# Patient Record
Sex: MALE | Race: WHITE | Employment: FULL TIME | URBAN - METROPOLITAN AREA
[De-identification: names, ages, dates, MRNs, and addresses within clinical notes are randomized per-mention and may not be internally consistent; named-entity substitution may affect disease eponyms.]

---

## 2017-01-03 ENCOUNTER — OFFICE VISIT (OUTPATIENT)
Dept: FAMILY MEDICINE CLINIC | Age: 62
End: 2017-01-03

## 2017-01-03 VITALS — SYSTOLIC BLOOD PRESSURE: 112 MMHG | TEMPERATURE: 97.1 F | DIASTOLIC BLOOD PRESSURE: 86 MMHG | HEART RATE: 80 BPM

## 2017-01-03 DIAGNOSIS — B86 SCABIES: ICD-10-CM

## 2017-01-03 DIAGNOSIS — L50.9 URTICARIA: ICD-10-CM

## 2017-01-03 DIAGNOSIS — J06.9 ACUTE UPPER RESPIRATORY INFECTION: Primary | ICD-10-CM

## 2017-01-03 PROCEDURE — 99213 OFFICE O/P EST LOW 20 MIN: CPT | Performed by: FAMILY MEDICINE

## 2017-01-03 RX ORDER — METHYLPREDNISOLONE 4 MG/1
TABLET ORAL
Qty: 1 KIT | Refills: 0 | Status: SHIPPED | OUTPATIENT
Start: 2017-01-03 | End: 2017-01-09

## 2017-01-03 RX ORDER — PERMETHRIN 50 MG/G
CREAM TOPICAL
Qty: 1 TUBE | Refills: 0 | Status: SHIPPED | OUTPATIENT
Start: 2017-01-03 | End: 2017-03-28 | Stop reason: ALTCHOICE

## 2017-01-03 RX ORDER — IBUPROFEN 800 MG/1
TABLET ORAL
Refills: 0 | COMMUNITY
Start: 2016-10-05 | End: 2017-03-28 | Stop reason: ALTCHOICE

## 2017-01-03 ASSESSMENT — ENCOUNTER SYMPTOMS
SORE THROAT: 1
SHORTNESS OF BREATH: 0
COUGH: 0
DIARRHEA: 0
CONSTIPATION: 0
ABDOMINAL PAIN: 0
EYES NEGATIVE: 1
NAUSEA: 0

## 2017-02-06 RX ORDER — PANTOPRAZOLE SODIUM 40 MG/1
TABLET, DELAYED RELEASE ORAL
Qty: 30 TABLET | Refills: 3 | Status: SHIPPED | OUTPATIENT
Start: 2017-02-06 | End: 2017-02-15 | Stop reason: SDUPTHER

## 2017-02-15 RX ORDER — PANTOPRAZOLE SODIUM 40 MG/1
40 TABLET, DELAYED RELEASE ORAL DAILY
Qty: 90 TABLET | Refills: 1 | Status: SHIPPED | OUTPATIENT
Start: 2017-02-15 | End: 2017-09-28 | Stop reason: SDUPTHER

## 2017-03-28 ENCOUNTER — OFFICE VISIT (OUTPATIENT)
Dept: FAMILY MEDICINE CLINIC | Age: 62
End: 2017-03-28

## 2017-03-28 VITALS
BODY MASS INDEX: 25.33 KG/M2 | RESPIRATION RATE: 14 BRPM | WEIGHT: 187 LBS | DIASTOLIC BLOOD PRESSURE: 80 MMHG | TEMPERATURE: 97.6 F | HEIGHT: 72 IN | HEART RATE: 86 BPM | SYSTOLIC BLOOD PRESSURE: 120 MMHG

## 2017-03-28 DIAGNOSIS — L73.9 FOLLICULITIS: ICD-10-CM

## 2017-03-28 DIAGNOSIS — J01.00 ACUTE NON-RECURRENT MAXILLARY SINUSITIS: Primary | ICD-10-CM

## 2017-03-28 DIAGNOSIS — B35.4 TINEA CORPORIS: ICD-10-CM

## 2017-03-28 DIAGNOSIS — R06.2 EXPIRATORY WHEEZING: ICD-10-CM

## 2017-03-28 PROCEDURE — 99213 OFFICE O/P EST LOW 20 MIN: CPT | Performed by: PHYSICIAN ASSISTANT

## 2017-03-28 RX ORDER — CEPHALEXIN 500 MG/1
500 CAPSULE ORAL 4 TIMES DAILY
Qty: 40 CAPSULE | Refills: 0 | Status: SHIPPED | OUTPATIENT
Start: 2017-03-28 | End: 2017-09-28 | Stop reason: ALTCHOICE

## 2017-03-28 RX ORDER — KETOCONAZOLE 20 MG/G
CREAM TOPICAL
Qty: 60 G | Refills: 1 | Status: SHIPPED | OUTPATIENT
Start: 2017-03-28 | End: 2017-09-28 | Stop reason: ALTCHOICE

## 2017-03-28 RX ORDER — METHYLPREDNISOLONE 4 MG/1
TABLET ORAL
Qty: 1 KIT | Refills: 0 | Status: SHIPPED | OUTPATIENT
Start: 2017-03-28 | End: 2017-04-03

## 2017-03-28 ASSESSMENT — ENCOUNTER SYMPTOMS
SORE THROAT: 0
SINUS PRESSURE: 1
CHEST TIGHTNESS: 1
WHEEZING: 0
RHINORRHEA: 1
SHORTNESS OF BREATH: 0
COLOR CHANGE: 0
COUGH: 1

## 2017-06-05 RX ORDER — PANTOPRAZOLE SODIUM 40 MG/1
TABLET, DELAYED RELEASE ORAL
Qty: 30 TABLET | Refills: 3 | Status: SHIPPED | OUTPATIENT
Start: 2017-06-05 | End: 2017-10-06 | Stop reason: SDUPTHER

## 2017-09-28 ENCOUNTER — OFFICE VISIT (OUTPATIENT)
Dept: FAMILY MEDICINE CLINIC | Age: 62
End: 2017-09-28

## 2017-09-28 VITALS
SYSTOLIC BLOOD PRESSURE: 120 MMHG | BODY MASS INDEX: 24.69 KG/M2 | TEMPERATURE: 96.3 F | DIASTOLIC BLOOD PRESSURE: 84 MMHG | HEART RATE: 71 BPM | OXYGEN SATURATION: 95 % | HEIGHT: 72 IN | WEIGHT: 182.31 LBS

## 2017-09-28 DIAGNOSIS — B96.89 ACUTE BACTERIAL SINUSITIS: Primary | ICD-10-CM

## 2017-09-28 DIAGNOSIS — J01.90 ACUTE BACTERIAL SINUSITIS: Primary | ICD-10-CM

## 2017-09-28 DIAGNOSIS — J42 CHRONIC BRONCHITIS, UNSPECIFIED CHRONIC BRONCHITIS TYPE (HCC): ICD-10-CM

## 2017-09-28 PROCEDURE — 99213 OFFICE O/P EST LOW 20 MIN: CPT | Performed by: FAMILY MEDICINE

## 2017-09-28 RX ORDER — METHYLPREDNISOLONE 4 MG/1
TABLET ORAL
Qty: 1 KIT | Refills: 0 | Status: SHIPPED | OUTPATIENT
Start: 2017-09-28 | End: 2019-01-29 | Stop reason: SDUPTHER

## 2017-09-28 RX ORDER — CEFDINIR 300 MG/1
600 CAPSULE ORAL DAILY
Qty: 20 CAPSULE | Refills: 0 | Status: SHIPPED | OUTPATIENT
Start: 2017-09-28 | End: 2017-12-21 | Stop reason: SDUPTHER

## 2017-10-06 RX ORDER — PANTOPRAZOLE SODIUM 40 MG/1
TABLET, DELAYED RELEASE ORAL
Qty: 30 TABLET | Refills: 3 | Status: SHIPPED | OUTPATIENT
Start: 2017-10-06 | End: 2018-02-12 | Stop reason: SDUPTHER

## 2017-12-21 ENCOUNTER — OFFICE VISIT (OUTPATIENT)
Dept: FAMILY MEDICINE CLINIC | Age: 62
End: 2017-12-21

## 2017-12-21 VITALS
RESPIRATION RATE: 12 BRPM | TEMPERATURE: 97.5 F | OXYGEN SATURATION: 96 % | DIASTOLIC BLOOD PRESSURE: 74 MMHG | BODY MASS INDEX: 24.68 KG/M2 | WEIGHT: 182.19 LBS | HEIGHT: 72 IN | HEART RATE: 69 BPM | SYSTOLIC BLOOD PRESSURE: 110 MMHG

## 2017-12-21 DIAGNOSIS — B96.89 ACUTE BACTERIAL SINUSITIS: Primary | ICD-10-CM

## 2017-12-21 DIAGNOSIS — J01.90 ACUTE BACTERIAL SINUSITIS: Primary | ICD-10-CM

## 2017-12-21 PROCEDURE — G8427 DOCREV CUR MEDS BY ELIG CLIN: HCPCS | Performed by: FAMILY MEDICINE

## 2017-12-21 PROCEDURE — G8420 CALC BMI NORM PARAMETERS: HCPCS | Performed by: FAMILY MEDICINE

## 2017-12-21 PROCEDURE — 4004F PT TOBACCO SCREEN RCVD TLK: CPT | Performed by: FAMILY MEDICINE

## 2017-12-21 PROCEDURE — 3017F COLORECTAL CA SCREEN DOC REV: CPT | Performed by: FAMILY MEDICINE

## 2017-12-21 PROCEDURE — 99213 OFFICE O/P EST LOW 20 MIN: CPT | Performed by: FAMILY MEDICINE

## 2017-12-21 PROCEDURE — G8484 FLU IMMUNIZE NO ADMIN: HCPCS | Performed by: FAMILY MEDICINE

## 2017-12-21 RX ORDER — CEFDINIR 300 MG/1
600 CAPSULE ORAL DAILY
Qty: 20 CAPSULE | Refills: 0 | Status: SHIPPED | OUTPATIENT
Start: 2017-12-21 | End: 2018-08-06 | Stop reason: SDUPTHER

## 2017-12-21 NOTE — PROGRESS NOTES
Denies any shortness of breath, chest pain, nausea or vomiting. No black stool, no blood in the stool. No heartburn. Denies any problems with constipation or diarrhea either. No dysuria type symptoms. Objective:     /74 (Site: Left Arm, Position: Sitting, Cuff Size: Medium Adult)   Pulse 69   Temp 97.5 °F (36.4 °C) (Temporal)   Resp 12   Ht 6' (1.829 m)   Wt 182 lb 3 oz (82.6 kg)   SpO2 96%   BMI 24.71 kg/m²     Physical Exam        PHYSICAL EXAMINATION:  Vital signs as noted. Alert, oriented in no acute distress. HEENT:  TMs are showing fluid bilaterally. Pharynx reveals postnasal drainage noted. Positive pain over sinuses and forehead  Pos shoddy adenopathy noted bilaterallyNECK: supple. HEART:  RRR without gallops. LUNGS:  clear to auscultation, no wheezing or rhonchi. ABDOMEN:  soft and nontender, bowel sounds positive. EXTREMITIES:  no edema. SKIN: without any changes      Assessment:      1. Acute bacterial sinusitis               Plan:        Orders Placed This Encounter   Medications    cefdinir (OMNICEF) 300 MG capsule     Sig: Take 2 capsules by mouth daily for 10 days     Dispense:  20 capsule     Refill:  0     No orders of the defined types were placed in this encounter.           Health Maintenance Due   Topic Date Due    Hepatitis C screen  1955    HIV screen  05/11/1972    DTaP/Tdap/Td vaccine (1 - Tdap) 05/11/1976    Pneumococcal med risk (1 of 1 - PPSV23) 05/11/1976    Lipid screen  03/10/2013    Smoker: low dose lung CT screening  12/21/2016    Zostavax vaccine  05/11/2017             Controlled Substances Monitoring:       if he does not get better he'll need to see ENT as he is high risk for any type of oral carcinogenic can cause his problem        If anything worsens or changes please call us at once , follow-up in the office as planned,

## 2018-02-12 RX ORDER — PANTOPRAZOLE SODIUM 40 MG/1
TABLET, DELAYED RELEASE ORAL
Qty: 30 TABLET | Refills: 3 | Status: SHIPPED | OUTPATIENT
Start: 2018-02-12 | End: 2018-06-12 | Stop reason: SDUPTHER

## 2018-06-12 RX ORDER — PANTOPRAZOLE SODIUM 40 MG/1
TABLET, DELAYED RELEASE ORAL
Qty: 30 TABLET | Refills: 0 | Status: SHIPPED | OUTPATIENT
Start: 2018-06-12 | End: 2018-07-19 | Stop reason: SDUPTHER

## 2018-07-16 RX ORDER — PANTOPRAZOLE SODIUM 40 MG/1
TABLET, DELAYED RELEASE ORAL
Qty: 30 TABLET | Refills: 0 | OUTPATIENT
Start: 2018-07-16

## 2018-07-16 NOTE — TELEPHONE ENCOUNTER
Requested Prescriptions     Refused Prescriptions Disp Refills    pantoprazole (PROTONIX) 40 MG tablet [Pharmacy Med Name: PANTOPRAZOLE SOD DR 40 MG TAB] 30 tablet 0     Sig: TAKE 1 TABLET BY MOUTH EVERY DAY     Refused By: Karel Camacho     Reason for Refusal: Patient needs an appointment         Last seen 12-21-17 and needs appt.  Last BW was 2008    Called patient - appt set for 7-19-18

## 2018-07-19 ENCOUNTER — OFFICE VISIT (OUTPATIENT)
Dept: FAMILY MEDICINE CLINIC | Age: 63
End: 2018-07-19
Payer: COMMERCIAL

## 2018-07-19 VITALS
DIASTOLIC BLOOD PRESSURE: 80 MMHG | HEART RATE: 64 BPM | SYSTOLIC BLOOD PRESSURE: 134 MMHG | RESPIRATION RATE: 18 BRPM | BODY MASS INDEX: 23.71 KG/M2 | TEMPERATURE: 97.6 F | WEIGHT: 178.9 LBS | HEIGHT: 73 IN

## 2018-07-19 DIAGNOSIS — Z12.5 SCREENING PSA (PROSTATE SPECIFIC ANTIGEN): ICD-10-CM

## 2018-07-19 DIAGNOSIS — Z00.00 ANNUAL PHYSICAL EXAM: Primary | ICD-10-CM

## 2018-07-19 DIAGNOSIS — I71.20 THORACIC AORTIC ANEURYSM WITHOUT RUPTURE: ICD-10-CM

## 2018-07-19 DIAGNOSIS — Z00.00 ANNUAL PHYSICAL EXAM: ICD-10-CM

## 2018-07-19 DIAGNOSIS — J42 CHRONIC BRONCHITIS, UNSPECIFIED CHRONIC BRONCHITIS TYPE (HCC): ICD-10-CM

## 2018-07-19 DIAGNOSIS — G47.30 SLEEP APNEA, UNSPECIFIED TYPE: ICD-10-CM

## 2018-07-19 DIAGNOSIS — K21.9 GASTROESOPHAGEAL REFLUX DISEASE WITHOUT ESOPHAGITIS: ICD-10-CM

## 2018-07-19 LAB
ALBUMIN SERPL-MCNC: 4.6 G/DL (ref 3.9–4.9)
ALP BLD-CCNC: 47 U/L (ref 35–104)
ALT SERPL-CCNC: 11 U/L (ref 0–41)
ANION GAP SERPL CALCULATED.3IONS-SCNC: 15 MEQ/L (ref 7–13)
AST SERPL-CCNC: 17 U/L (ref 0–40)
BASOPHILS ABSOLUTE: 0.1 K/UL (ref 0–0.2)
BASOPHILS RELATIVE PERCENT: 1 %
BILIRUB SERPL-MCNC: 1.3 MG/DL (ref 0–1.2)
BUN BLDV-MCNC: 15 MG/DL (ref 8–23)
CALCIUM SERPL-MCNC: 9.4 MG/DL (ref 8.6–10.2)
CHLORIDE BLD-SCNC: 101 MEQ/L (ref 98–107)
CHOLESTEROL, TOTAL: 179 MG/DL (ref 0–199)
CO2: 24 MEQ/L (ref 22–29)
CREAT SERPL-MCNC: 0.83 MG/DL (ref 0.7–1.2)
EOSINOPHILS ABSOLUTE: 0.1 K/UL (ref 0–0.7)
EOSINOPHILS RELATIVE PERCENT: 1.3 %
GFR AFRICAN AMERICAN: >60
GFR NON-AFRICAN AMERICAN: >60
GLOBULIN: 2.7 G/DL (ref 2.3–3.5)
GLUCOSE BLD-MCNC: 76 MG/DL (ref 74–109)
HCT VFR BLD CALC: 42.4 % (ref 42–52)
HDLC SERPL-MCNC: 58 MG/DL (ref 40–59)
HEMOGLOBIN: 14.4 G/DL (ref 14–18)
LDL CHOLESTEROL CALCULATED: 109 MG/DL (ref 0–129)
LYMPHOCYTES ABSOLUTE: 2.1 K/UL (ref 1–4.8)
LYMPHOCYTES RELATIVE PERCENT: 28.9 %
MCH RBC QN AUTO: 32.1 PG (ref 27–31.3)
MCHC RBC AUTO-ENTMCNC: 33.9 % (ref 33–37)
MCV RBC AUTO: 94.7 FL (ref 80–100)
MONOCYTES ABSOLUTE: 0.5 K/UL (ref 0.2–0.8)
MONOCYTES RELATIVE PERCENT: 7.4 %
NEUTROPHILS ABSOLUTE: 4.5 K/UL (ref 1.4–6.5)
NEUTROPHILS RELATIVE PERCENT: 61.4 %
PDW BLD-RTO: 14 % (ref 11.5–14.5)
PLATELET # BLD: 243 K/UL (ref 130–400)
POTASSIUM SERPL-SCNC: 4.1 MEQ/L (ref 3.5–5.1)
PROSTATE SPECIFIC ANTIGEN: 0.91 NG/ML (ref 0–5.4)
RBC # BLD: 4.48 M/UL (ref 4.7–6.1)
SODIUM BLD-SCNC: 140 MEQ/L (ref 132–144)
TOTAL PROTEIN: 7.3 G/DL (ref 6.4–8.1)
TRIGL SERPL-MCNC: 58 MG/DL (ref 0–200)
WBC # BLD: 7.3 K/UL (ref 4.8–10.8)

## 2018-07-19 PROCEDURE — 93000 ELECTROCARDIOGRAM COMPLETE: CPT | Performed by: FAMILY MEDICINE

## 2018-07-19 PROCEDURE — 99396 PREV VISIT EST AGE 40-64: CPT | Performed by: FAMILY MEDICINE

## 2018-07-19 RX ORDER — PANTOPRAZOLE SODIUM 40 MG/1
TABLET, DELAYED RELEASE ORAL
Qty: 90 TABLET | Refills: 3 | Status: SHIPPED | OUTPATIENT
Start: 2018-07-19

## 2018-07-19 ASSESSMENT — PATIENT HEALTH QUESTIONNAIRE - PHQ9
SUM OF ALL RESPONSES TO PHQ9 QUESTIONS 1 & 2: 0
2. FEELING DOWN, DEPRESSED OR HOPELESS: 0
1. LITTLE INTEREST OR PLEASURE IN DOING THINGS: 0
SUM OF ALL RESPONSES TO PHQ QUESTIONS 1-9: 0

## 2018-08-06 ENCOUNTER — OFFICE VISIT (OUTPATIENT)
Dept: FAMILY MEDICINE CLINIC | Age: 63
End: 2018-08-06
Payer: COMMERCIAL

## 2018-08-06 VITALS
SYSTOLIC BLOOD PRESSURE: 122 MMHG | BODY MASS INDEX: 24 KG/M2 | RESPIRATION RATE: 12 BRPM | HEIGHT: 72 IN | WEIGHT: 177.19 LBS | DIASTOLIC BLOOD PRESSURE: 80 MMHG | HEART RATE: 89 BPM | TEMPERATURE: 96.7 F | OXYGEN SATURATION: 95 %

## 2018-08-06 DIAGNOSIS — J01.90 ACUTE BACTERIAL SINUSITIS: Primary | ICD-10-CM

## 2018-08-06 DIAGNOSIS — K21.9 GASTROESOPHAGEAL REFLUX DISEASE WITHOUT ESOPHAGITIS: ICD-10-CM

## 2018-08-06 DIAGNOSIS — B96.89 ACUTE BACTERIAL SINUSITIS: Primary | ICD-10-CM

## 2018-08-06 PROCEDURE — 4004F PT TOBACCO SCREEN RCVD TLK: CPT | Performed by: FAMILY MEDICINE

## 2018-08-06 PROCEDURE — G8420 CALC BMI NORM PARAMETERS: HCPCS | Performed by: FAMILY MEDICINE

## 2018-08-06 PROCEDURE — G8427 DOCREV CUR MEDS BY ELIG CLIN: HCPCS | Performed by: FAMILY MEDICINE

## 2018-08-06 PROCEDURE — 99213 OFFICE O/P EST LOW 20 MIN: CPT | Performed by: FAMILY MEDICINE

## 2018-08-06 PROCEDURE — 3017F COLORECTAL CA SCREEN DOC REV: CPT | Performed by: FAMILY MEDICINE

## 2018-08-06 RX ORDER — CEFDINIR 300 MG/1
600 CAPSULE ORAL DAILY
Qty: 20 CAPSULE | Refills: 0 | Status: SHIPPED | OUTPATIENT
Start: 2018-08-06 | End: 2018-08-16

## 2019-01-29 ENCOUNTER — OFFICE VISIT (OUTPATIENT)
Dept: FAMILY MEDICINE CLINIC | Age: 64
End: 2019-01-29
Payer: COMMERCIAL

## 2019-01-29 VITALS
BODY MASS INDEX: 23.23 KG/M2 | DIASTOLIC BLOOD PRESSURE: 66 MMHG | HEIGHT: 72 IN | TEMPERATURE: 98.6 F | WEIGHT: 171.5 LBS | OXYGEN SATURATION: 98 % | RESPIRATION RATE: 14 BRPM | SYSTOLIC BLOOD PRESSURE: 122 MMHG | HEART RATE: 69 BPM

## 2019-01-29 DIAGNOSIS — J42 CHRONIC BRONCHITIS, UNSPECIFIED CHRONIC BRONCHITIS TYPE (HCC): ICD-10-CM

## 2019-01-29 DIAGNOSIS — B96.89 ACUTE BACTERIAL SINUSITIS: Primary | ICD-10-CM

## 2019-01-29 DIAGNOSIS — J01.90 ACUTE BACTERIAL SINUSITIS: Primary | ICD-10-CM

## 2019-01-29 PROCEDURE — 3017F COLORECTAL CA SCREEN DOC REV: CPT | Performed by: FAMILY MEDICINE

## 2019-01-29 PROCEDURE — G8420 CALC BMI NORM PARAMETERS: HCPCS | Performed by: FAMILY MEDICINE

## 2019-01-29 PROCEDURE — 4004F PT TOBACCO SCREEN RCVD TLK: CPT | Performed by: FAMILY MEDICINE

## 2019-01-29 PROCEDURE — G8484 FLU IMMUNIZE NO ADMIN: HCPCS | Performed by: FAMILY MEDICINE

## 2019-01-29 PROCEDURE — G8926 SPIRO NO PERF OR DOC: HCPCS | Performed by: FAMILY MEDICINE

## 2019-01-29 PROCEDURE — 99213 OFFICE O/P EST LOW 20 MIN: CPT | Performed by: FAMILY MEDICINE

## 2019-01-29 PROCEDURE — G8427 DOCREV CUR MEDS BY ELIG CLIN: HCPCS | Performed by: FAMILY MEDICINE

## 2019-01-29 PROCEDURE — 3023F SPIROM DOC REV: CPT | Performed by: FAMILY MEDICINE

## 2019-01-29 RX ORDER — METHYLPREDNISOLONE 4 MG/1
TABLET ORAL
Qty: 1 KIT | Refills: 0 | Status: SHIPPED | OUTPATIENT
Start: 2019-01-29 | End: 2019-02-04

## 2019-01-29 RX ORDER — AZITHROMYCIN 250 MG/1
500 TABLET, FILM COATED ORAL DAILY
Qty: 14 TABLET | Refills: 0 | Status: SHIPPED | OUTPATIENT
Start: 2019-01-29 | End: 2019-02-05

## 2019-04-12 ENCOUNTER — TELEPHONE (OUTPATIENT)
Dept: FAMILY MEDICINE CLINIC | Age: 64
End: 2019-04-12

## 2023-03-12 DIAGNOSIS — K21.9 GASTRO-ESOPHAGEAL REFLUX DISEASE WITHOUT ESOPHAGITIS: ICD-10-CM

## 2023-03-14 RX ORDER — PANTOPRAZOLE SODIUM 40 MG/1
TABLET, DELAYED RELEASE ORAL
Qty: 90 TABLET | Refills: 1 | Status: SHIPPED | OUTPATIENT
Start: 2023-03-14 | End: 2023-09-08

## 2023-07-19 ENCOUNTER — OFFICE VISIT (OUTPATIENT)
Dept: PRIMARY CARE | Facility: CLINIC | Age: 68
End: 2023-07-19
Payer: MEDICARE

## 2023-07-19 VITALS
RESPIRATION RATE: 16 BRPM | WEIGHT: 188.6 LBS | HEIGHT: 72 IN | BODY MASS INDEX: 25.55 KG/M2 | SYSTOLIC BLOOD PRESSURE: 116 MMHG | DIASTOLIC BLOOD PRESSURE: 82 MMHG | OXYGEN SATURATION: 99 % | TEMPERATURE: 97.8 F | HEART RATE: 63 BPM

## 2023-07-19 DIAGNOSIS — R53.81 MALAISE AND FATIGUE: ICD-10-CM

## 2023-07-19 DIAGNOSIS — Z12.5 PROSTATE CANCER SCREENING: ICD-10-CM

## 2023-07-19 DIAGNOSIS — R42 DIZZINESS: ICD-10-CM

## 2023-07-19 DIAGNOSIS — J44.9 CHRONIC OBSTRUCTIVE PULMONARY DISEASE, UNSPECIFIED COPD TYPE (MULTI): ICD-10-CM

## 2023-07-19 DIAGNOSIS — R53.83 FATIGUE, UNSPECIFIED TYPE: ICD-10-CM

## 2023-07-19 DIAGNOSIS — T78.40XS ALLERGY, SEQUELA: ICD-10-CM

## 2023-07-19 DIAGNOSIS — I71.21 ANEURYSM OF ASCENDING AORTA WITHOUT RUPTURE (CMS-HCC): Primary | ICD-10-CM

## 2023-07-19 DIAGNOSIS — K21.9 GASTROESOPHAGEAL REFLUX DISEASE, UNSPECIFIED WHETHER ESOPHAGITIS PRESENT: ICD-10-CM

## 2023-07-19 DIAGNOSIS — E78.5 DYSLIPIDEMIA: ICD-10-CM

## 2023-07-19 DIAGNOSIS — Z12.11 COLON CANCER SCREENING: ICD-10-CM

## 2023-07-19 DIAGNOSIS — J34.89 SINUS PRESSURE: ICD-10-CM

## 2023-07-19 DIAGNOSIS — R53.83 MALAISE AND FATIGUE: ICD-10-CM

## 2023-07-19 DIAGNOSIS — K21.9 GASTRO-ESOPHAGEAL REFLUX DISEASE WITHOUT ESOPHAGITIS: ICD-10-CM

## 2023-07-19 PROBLEM — T78.40XA ALLERGIES: Status: ACTIVE | Noted: 2023-07-19

## 2023-07-19 PROCEDURE — 1160F RVW MEDS BY RX/DR IN RCRD: CPT | Performed by: FAMILY MEDICINE

## 2023-07-19 PROCEDURE — 99214 OFFICE O/P EST MOD 30 MIN: CPT | Performed by: FAMILY MEDICINE

## 2023-07-19 PROCEDURE — 3008F BODY MASS INDEX DOCD: CPT | Performed by: FAMILY MEDICINE

## 2023-07-19 PROCEDURE — 1036F TOBACCO NON-USER: CPT | Performed by: FAMILY MEDICINE

## 2023-07-19 PROCEDURE — 1159F MED LIST DOCD IN RCRD: CPT | Performed by: FAMILY MEDICINE

## 2023-07-19 ASSESSMENT — PATIENT HEALTH QUESTIONNAIRE - PHQ9
2. FEELING DOWN, DEPRESSED OR HOPELESS: NOT AT ALL
1. LITTLE INTEREST OR PLEASURE IN DOING THINGS: NOT AT ALL
SUM OF ALL RESPONSES TO PHQ9 QUESTIONS 1 AND 2: 0

## 2023-07-19 NOTE — LETTER
July 31, 2023     Gary Contreras  [address] [facility] [address]      Dear Mr. Contreras:    Below are the results from your recent visit:    Resulted Orders   Cologuard® colon cancer screening   Result Value Ref Range    NONINV COLON CA DNA+OCC BLD SCRN STL QL Negative Negative      Comment:        NEGATIVE TEST RESULT. A negative Cologuard result indicates a low likelihood that a colorectal cancer (CRC) or advanced adenoma (adenomatous polyps with more advanced pre-malignant features)  is present. The chance that a person with a negative Cologuard test has a colorectal cancer is less than 1 in 1500 (negative predictive value >99.9%) or has an  advanced adenoma is less than  5.3% (negative predictive value 94.7%). These data are based on a prospective cross-sectional study of 10,000 individuals at average risk for colorectal cancer who were screened with both Cologuard and colonoscopy. (Dinora Crump al, N Engl J Med 2014;370(14):7888-0739) The normal value (reference range) for this assay is negative.    COLOGUARD RE-SCREENING RECOMMENDATION: Periodic colorectal cancer screening is an important part of preventive healthcare for asymptomatic individuals at average risk for colorectal cancer.  Following a negative Cologuard result, the American Cancer Society and U.S.  Multi-Society Task Force screening guidelines recommend a Cologuard re-screening interval of 3 years.   References: American Cancer Society Guideline for Colorectal Cancer Screening: https://www.cancer.org/cancer/colon-rectal-cancer/uyofrnutc-fyepleqrg-fhoqzgp/acs-recommendations.html.; Navarro DK, Lv STRAUSS, Angelia HERNANDEZK, Colorectal Cancer Screening: Recommendations for Physicians and Patients from the U.S. Multi-Society Task Force on Colorectal Cancer Screening , Am J Gastroenterology 2017; 112:1870-6286.    TEST DESCRIPTION: Composite algorithmic analysis of stool DNA-biomarkers with hemoglobin immunoassay.   Quantitative values of individual  biomarkers are not reportable and are not associated with individual biomarker result reference ranges. Cologuard is intended for colorectal cancer screening of adults of either sex, 45 years or older, who are at average-risk for colorectal cancer (CRC). Cologuard has been approved for use by the U.S. FDA. The performance of Cologuard was  established in a cross sectional study of average-risk adults aged 50-84. Cologuard performance in patients ages 45 to 49 years was estimated by sub-group analysis of near-age groups. Colonoscopies performed for a positive result may find as the most clinically significant lesion: colorectal cancer [4.0%], advanced adenoma (including sessile serrated polyps greater than or equal to 1cm diameter) [20%] or non- advanced adenoma [31%]; or no colorectal neoplasia [45%]. These estimates are derived from a prospective cross-sectional screening study of 10,000 individuals at average risk for colorectal cancer who were screened with both Cologuard and colonoscopy. (Dinora PAULA. et al, N Engl J Med 2014;370(14):6277-3118.) Cologuard may produce a false negative or false positive result (no colorectal cancer or precancerous polyp present at colonoscopy follow up). A negative Cologuard test result does not guarantee the absence of CRC or advanced adenoma (pre-cancer). The current Cologuard  screening interval is every 3 years. (American Cancer Society and U.S. Multi-Society Task Force). Cologuard performance data in a 10,000 patient pivotal study using colonoscopy as the reference method can be accessed at the following location: www.[employer].Scannx/results. Additional description of the Cologuard test process, warnings and precautions can be found at [email or web address]rd.[email or web address].       The test results show that your cologuard stool test is negative, repeat in 3 years     If you have any questions or concerns, please don't hesitate to call.         Sincerely,        Tutu Fay, DO

## 2023-07-19 NOTE — PROGRESS NOTES
Subjective   Patient ID: Gary Contreras is a 68 y.o. male who presents for Neck Pain and Sinusitis.  HPI  Pt is here for neck pain x 2month  Pt states having very stiff neck and swelling  Pt rate pain level 9 /10  Pt is not taking any OTC for his pain    Pt is still having heavy eye, ring in both ear  Uncomfortable swalling   Pt is have sinus headaches, bring up mucus white in color  Pt is not taking any OTC medication for symptoms   Sometimes he feels off balance or the room is spinning does not know if that has anything to do with it      No history of TIAs of coronary disease his calcium score was 26    Pt is also getting 2 dental inplants      Review of systems  ; Patient seen today for exam denies any problems with headaches or vision, denies any shortness of breath chest pain nausea or vomiting, no black stool no blood in the stool no heartburn type symptoms denies any problems with constipation or diarrhea, and no dysuria-type symptoms    The patient's allergies medications were reviewed with them today    The patient's social family and surgical history or also reviewed here today, along with her past medical history.     Objective     Alert and active in  no acute distress  HEENT TMs clear oropharynx negative nares clear no drainage noted neck supple  With no adenopathy   Heart regular rate and rhythm without murmur and no carotid bruits  Lungs- clear to auscultation bilaterally, no wheeze or rhonchi noted  Thyroid -negative masses or nodularity  Abdomen- soft times four quadrants , bowel sounds positive no masses or organomegaly, negative tenderness guarding or rebound  Neurological exam unremarkable- DTRs in upper and lower extremities within normal limits.   skin -no lesions noted      /82 (BP Location: Right arm, Patient Position: Sitting, BP Cuff Size: Adult)   Pulse 63   Temp 36.6 °C (97.8 °F) (Temporal)   Resp 16   Ht 1.829 m (6')   Wt 85.5 kg (188 lb 9.6 oz)   SpO2 99%   BMI 25.58  kg/m²     No Known Allergies    Assessment/Plan   Problem List Items Addressed This Visit       Allergies    COPD (chronic obstructive pulmonary disease) (CMS/HCC)    Dyslipidemia    Relevant Orders    Lipid Panel    CBC and Auto Differential    Comprehensive Metabolic Panel    Carotid duplex bilateral    Fatigue    Relevant Orders    Lipid Panel    CBC and Auto Differential    GERD (gastroesophageal reflux disease)    Malaise and fatigue    Relevant Orders    Comprehensive Metabolic Panel    Sinus pressure    BMI 25.0-25.9,adult    Aneurysm of ascending aorta without rupture (CMS/[facility]) - Primary     Other Visit Diagnoses       Gastro-esophageal reflux disease without esophagitis        Prostate cancer screening        Relevant Orders    Prostate Specific Antigen, Screen    Colon cancer screening        Relevant Orders    Cologuard® colon cancer screening    Dizziness        Relevant Orders    Carotid duplex bilateral        With the dizziness he has been having and the ringing in his ears would like to check his carotids his brother recently had carotid endarterectomy    He is also refused inhalers in the past which we recommend highly as he does have emphysema reviewed his chest CT with him again making sure he gets this rechecked in the year      If anything worsens or changes please call us at once, follow up in the office as planned,

## 2023-07-29 LAB — NONINV COLON CA DNA+OCC BLD SCRN STL QL: NEGATIVE

## 2023-08-03 ENCOUNTER — LAB (OUTPATIENT)
Dept: LAB | Facility: LAB | Age: 68
End: 2023-08-03
Payer: MEDICARE

## 2023-08-03 DIAGNOSIS — R53.81 MALAISE AND FATIGUE: ICD-10-CM

## 2023-08-03 DIAGNOSIS — E78.5 DYSLIPIDEMIA: ICD-10-CM

## 2023-08-03 DIAGNOSIS — Z12.5 PROSTATE CANCER SCREENING: ICD-10-CM

## 2023-08-03 DIAGNOSIS — R53.83 MALAISE AND FATIGUE: ICD-10-CM

## 2023-08-03 DIAGNOSIS — R53.83 FATIGUE, UNSPECIFIED TYPE: ICD-10-CM

## 2023-08-03 LAB
ALANINE AMINOTRANSFERASE (SGPT) (U/L) IN SER/PLAS: 8 U/L (ref 10–52)
ALBUMIN (G/DL) IN SER/PLAS: 4.6 G/DL (ref 3.4–5)
ALKALINE PHOSPHATASE (U/L) IN SER/PLAS: 38 U/L (ref 33–136)
ANION GAP IN SER/PLAS: 12 MMOL/L (ref 10–20)
ASPARTATE AMINOTRANSFERASE (SGOT) (U/L) IN SER/PLAS: 14 U/L (ref 9–39)
BASOPHILS (10*3/UL) IN BLOOD BY AUTOMATED COUNT: 0.07 X10E9/L (ref 0–0.1)
BASOPHILS/100 LEUKOCYTES IN BLOOD BY AUTOMATED COUNT: 1.1 % (ref 0–2)
BILIRUBIN TOTAL (MG/DL) IN SER/PLAS: 1.7 MG/DL (ref 0–1.2)
CALCIUM (MG/DL) IN SER/PLAS: 9.5 MG/DL (ref 8.6–10.3)
CARBON DIOXIDE, TOTAL (MMOL/L) IN SER/PLAS: 29 MMOL/L (ref 21–32)
CHLORIDE (MMOL/L) IN SER/PLAS: 104 MMOL/L (ref 98–107)
CHOLESTEROL (MG/DL) IN SER/PLAS: 193 MG/DL (ref 0–199)
CHOLESTEROL IN HDL (MG/DL) IN SER/PLAS: 53.7 MG/DL
CHOLESTEROL/HDL RATIO: 3.6
CREATININE (MG/DL) IN SER/PLAS: 1.14 MG/DL (ref 0.5–1.3)
EOSINOPHILS (10*3/UL) IN BLOOD BY AUTOMATED COUNT: 0.1 X10E9/L (ref 0–0.7)
EOSINOPHILS/100 LEUKOCYTES IN BLOOD BY AUTOMATED COUNT: 1.6 % (ref 0–6)
ERYTHROCYTE DISTRIBUTION WIDTH (RATIO) BY AUTOMATED COUNT: 12.9 % (ref 11.5–14.5)
ERYTHROCYTE MEAN CORPUSCULAR HEMOGLOBIN CONCENTRATION (G/DL) BY AUTOMATED: 32.9 G/DL (ref 32–36)
ERYTHROCYTE MEAN CORPUSCULAR VOLUME (FL) BY AUTOMATED COUNT: 92 FL (ref 80–100)
ERYTHROCYTES (10*6/UL) IN BLOOD BY AUTOMATED COUNT: 4.47 X10E12/L (ref 4.5–5.9)
GFR MALE: 71 ML/MIN/1.73M2
GLUCOSE (MG/DL) IN SER/PLAS: 88 MG/DL (ref 74–99)
HEMATOCRIT (%) IN BLOOD BY AUTOMATED COUNT: 41.3 % (ref 41–52)
HEMOGLOBIN (G/DL) IN BLOOD: 13.6 G/DL (ref 13.5–17.5)
IMMATURE GRANULOCYTES/100 LEUKOCYTES IN BLOOD BY AUTOMATED COUNT: 0.5 % (ref 0–0.9)
LDL: 118 MG/DL (ref 0–99)
LEUKOCYTES (10*3/UL) IN BLOOD BY AUTOMATED COUNT: 6.4 X10E9/L (ref 4.4–11.3)
LYMPHOCYTES (10*3/UL) IN BLOOD BY AUTOMATED COUNT: 1.92 X10E9/L (ref 1.2–4.8)
LYMPHOCYTES/100 LEUKOCYTES IN BLOOD BY AUTOMATED COUNT: 29.9 % (ref 13–44)
MONOCYTES (10*3/UL) IN BLOOD BY AUTOMATED COUNT: 0.61 X10E9/L (ref 0.1–1)
MONOCYTES/100 LEUKOCYTES IN BLOOD BY AUTOMATED COUNT: 9.5 % (ref 2–10)
NEUTROPHILS (10*3/UL) IN BLOOD BY AUTOMATED COUNT: 3.7 X10E9/L (ref 1.2–7.7)
NEUTROPHILS/100 LEUKOCYTES IN BLOOD BY AUTOMATED COUNT: 57.4 % (ref 40–80)
PLATELETS (10*3/UL) IN BLOOD AUTOMATED COUNT: 228 X10E9/L (ref 150–450)
POTASSIUM (MMOL/L) IN SER/PLAS: 4.3 MMOL/L (ref 3.5–5.3)
PROSTATE SPECIFIC ANTIGEN,SCREEN: 0.94 NG/ML (ref 0–4)
PROTEIN TOTAL: 7.3 G/DL (ref 6.4–8.2)
SODIUM (MMOL/L) IN SER/PLAS: 141 MMOL/L (ref 136–145)
TRIGLYCERIDE (MG/DL) IN SER/PLAS: 109 MG/DL (ref 0–149)
UREA NITROGEN (MG/DL) IN SER/PLAS: 10 MG/DL (ref 6–23)
VLDL: 22 MG/DL (ref 0–40)

## 2023-08-03 PROCEDURE — 80053 COMPREHEN METABOLIC PANEL: CPT

## 2023-08-03 PROCEDURE — 36415 COLL VENOUS BLD VENIPUNCTURE: CPT

## 2023-08-03 PROCEDURE — 85025 COMPLETE CBC W/AUTO DIFF WBC: CPT

## 2023-08-03 PROCEDURE — 80061 LIPID PANEL: CPT

## 2023-08-03 PROCEDURE — G0103 PSA SCREENING: HCPCS

## 2023-08-09 ENCOUNTER — TELEPHONE (OUTPATIENT)
Dept: PRIMARY CARE | Facility: CLINIC | Age: 68
End: 2023-08-09
Payer: MEDICARE

## 2023-09-08 DIAGNOSIS — K21.9 GASTRO-ESOPHAGEAL REFLUX DISEASE WITHOUT ESOPHAGITIS: ICD-10-CM

## 2023-09-08 RX ORDER — PANTOPRAZOLE SODIUM 40 MG/1
TABLET, DELAYED RELEASE ORAL
Qty: 90 TABLET | Refills: 0 | Status: SHIPPED | OUTPATIENT
Start: 2023-09-08 | End: 2023-12-11

## 2023-12-09 DIAGNOSIS — K21.9 GASTRO-ESOPHAGEAL REFLUX DISEASE WITHOUT ESOPHAGITIS: ICD-10-CM

## 2023-12-11 RX ORDER — PANTOPRAZOLE SODIUM 40 MG/1
TABLET, DELAYED RELEASE ORAL
Qty: 90 TABLET | Refills: 0 | Status: SHIPPED | OUTPATIENT
Start: 2023-12-11 | End: 2024-03-11

## 2024-01-04 ENCOUNTER — OFFICE VISIT (OUTPATIENT)
Dept: PRIMARY CARE | Facility: CLINIC | Age: 69
End: 2024-01-04
Payer: MEDICARE

## 2024-01-04 VITALS
RESPIRATION RATE: 16 BRPM | TEMPERATURE: 97.4 F | WEIGHT: 194 LBS | OXYGEN SATURATION: 98 % | BODY MASS INDEX: 26.28 KG/M2 | SYSTOLIC BLOOD PRESSURE: 114 MMHG | HEART RATE: 76 BPM | HEIGHT: 72 IN | DIASTOLIC BLOOD PRESSURE: 70 MMHG

## 2024-01-04 DIAGNOSIS — K13.0 CHEILITIS: Primary | ICD-10-CM

## 2024-01-04 DIAGNOSIS — I71.21 ANEURYSM OF ASCENDING AORTA WITHOUT RUPTURE (CMS-HCC): ICD-10-CM

## 2024-01-04 DIAGNOSIS — H57.13 PAIN OF BOTH EYES: ICD-10-CM

## 2024-01-04 DIAGNOSIS — L53.9 REDNESS: ICD-10-CM

## 2024-01-04 DIAGNOSIS — J32.9 CHRONIC SINUSITIS, UNSPECIFIED LOCATION: ICD-10-CM

## 2024-01-04 DIAGNOSIS — J44.9 CHRONIC OBSTRUCTIVE PULMONARY DISEASE, UNSPECIFIED COPD TYPE (MULTI): ICD-10-CM

## 2024-01-04 DIAGNOSIS — Z87.891 FORMER SMOKER: ICD-10-CM

## 2024-01-04 DIAGNOSIS — H93.13 TINNITUS OF BOTH EARS: ICD-10-CM

## 2024-01-04 DIAGNOSIS — R51.9 ACUTE INTRACTABLE HEADACHE, UNSPECIFIED HEADACHE TYPE: ICD-10-CM

## 2024-01-04 PROCEDURE — 1036F TOBACCO NON-USER: CPT | Performed by: FAMILY MEDICINE

## 2024-01-04 PROCEDURE — 1159F MED LIST DOCD IN RCRD: CPT | Performed by: FAMILY MEDICINE

## 2024-01-04 PROCEDURE — 99214 OFFICE O/P EST MOD 30 MIN: CPT | Performed by: FAMILY MEDICINE

## 2024-01-04 PROCEDURE — 1160F RVW MEDS BY RX/DR IN RCRD: CPT | Performed by: FAMILY MEDICINE

## 2024-01-04 PROCEDURE — 3008F BODY MASS INDEX DOCD: CPT | Performed by: FAMILY MEDICINE

## 2024-01-04 RX ORDER — CLOTRIMAZOLE AND BETAMETHASONE DIPROPIONATE 10; .64 MG/G; MG/G
CREAM TOPICAL
Qty: 15 G | Refills: 1 | Status: SHIPPED | OUTPATIENT
Start: 2024-01-04

## 2024-01-04 NOTE — PROGRESS NOTES
Subjective   Patient ID: Gary Contreras is a 68 y.o. male who presents for Tinnitus, Eye Pain, and Chest Pain.    HPI    Pt is being seen for fatigue and redness in the chest  Ongoing for 2 month ago  No rash just redness on the chest  Pt states  that he is having some tightness in the chest    Pt is also having ringing in the both ears, both eye pain.   Pt states that the ringing has been going on for last few month   Pt states that with both eye pain comes headaches at times  Denies photosensitivity.  Pt is not taking any thing for his symptoms   Has not been to ENT.  Describes dead air space.  Hearing normal.  Feels sinus pressure and eye pain.   States mother had thyroid issues and he is concerned about thyroid eye disease.  Has not seen eye doctor in a while.    Bowels normal.       No other concern / question  Review of systems  ; Patient seen today for exam denies any problems with headaches or vision, denies any shortness of breath chest pain nausea or vomiting, no black stool no blood in the stool no heartburn type symptoms denies any problems with constipation or diarrhea, and no dysuria-type symptoms    The patient's allergies medications were reviewed with them today    The patient's social family and surgical history or also reviewed here today, along with her past medical history.     Objective     Alert and active in  no acute distress  HEENT TMs clear oropharynx negative nares clear no drainage noted neck supple  With no adenopathy   Heart regular rate and rhythm without murmur and no carotid bruits  Lungs- clear to auscultation bilaterally, no wheeze or rhonchi noted  Thyroid -negative masses or nodularity  Abdomen- soft times four quadrants , bowel sounds positive no masses or organomegaly, negative tenderness guarding or rebound  Neurological exam unremarkable- DTRs in upper and lower extremities within normal limits.   skin -no lesions noted///cheilitis noted around both lower lobes consistent  with yeast      /70 (BP Location: Left arm, Patient Position: Sitting, BP Cuff Size: Adult)   Pulse 76   Temp 36.3 °C (97.4 °F) (Temporal)   Resp 16   Ht 1.829 m (6')   Wt 88 kg (194 lb)   SpO2 98%   BMI 26.31 kg/m²     No Known Allergies    Assessment/Plan   Problem List Items Addressed This Visit       COPD (chronic obstructive pulmonary disease) (CMS/HCC)    Aneurysm of ascending aorta without rupture (CMS/[facility])     Other Visit Diagnoses       Cheilitis    -  Primary    Relevant Medications    clotrimazole-betamethasone (Lotrisone) cream    BMI 26.0-26.9,adult        Pain of both eyes        Relevant Orders    Referral to Ophthalmology    Former smoker        Relevant Orders    CT lung screening low dose    Referral to ENT    Redness        Tinnitus of both ears        Relevant Orders    Referral to ENT    Chronic sinusitis, unspecified location        Relevant Orders    CT sinus wo IV contrast    Acute intractable headache, unspecified headache type        Relevant Orders    CT head wo IV contrast          Recommend getting his hearing checked.  Referral to ENT ordered.    He should follow up with ophthalmologist to have eyes checked.  Ophthalmology referral placed.    CT lung screen ordered.     Reviewed labs form August.     CT of head and sinuses ordered.     If anything worsens or changes please call us at once, follow up in the office as planned.    Scribe Attestation  By signing my name below, I, Jewels Amado MA, Scribe   attest that this documentation has been prepared under the direction and in the presence of Tutu Fay DO.

## 2024-01-05 ENCOUNTER — CLINICAL SUPPORT (OUTPATIENT)
Dept: AUDIOLOGY | Facility: CLINIC | Age: 69
End: 2024-01-05
Payer: MEDICARE

## 2024-01-05 ENCOUNTER — TELEPHONE (OUTPATIENT)
Dept: PRIMARY CARE | Facility: CLINIC | Age: 69
End: 2024-01-05
Payer: MEDICARE

## 2024-01-05 DIAGNOSIS — H93.13 TINNITUS OF BOTH EARS: Primary | ICD-10-CM

## 2024-01-05 PROCEDURE — 92557 COMPREHENSIVE HEARING TEST: CPT | Performed by: AUDIOLOGIST

## 2024-01-05 PROCEDURE — 92550 TYMPANOMETRY & REFLEX THRESH: CPT | Performed by: AUDIOLOGIST

## 2024-01-05 ASSESSMENT — PAIN - FUNCTIONAL ASSESSMENT: PAIN_FUNCTIONAL_ASSESSMENT: 0-10

## 2024-01-05 ASSESSMENT — ENCOUNTER SYMPTOMS: OCCASIONAL FEELINGS OF UNSTEADINESS: 0

## 2024-01-05 ASSESSMENT — PAIN SCALES - GENERAL: PAINLEVEL_OUTOF10: 0 - NO PAIN

## 2024-01-05 NOTE — PROGRESS NOTES
AUDIOLOGY ADULT AUDIOMETRIC EVALUATION      Name:  Gary Contreras  :  1955  Age:  68 y.o.  Date of Evaluation: 24    History:  Reason for visit:  Mr. Gary Contreras was seen today as part of the visit with Ang Bautista D.O. for an evaluation of hearing.   His current primary care physician is Tutu Fay DO   Chief Complaint   Patient presents with    Tinnitus     The patient stated he has experienced constant bilateral non-pulsatile tinnitus for many years. Reported the tinnitus typically sounds like a constant running noise type of sound. Denied any significant difficulty sleeping due to the tinnitus and stated it does not interfere with his daily living activities.   Mentioned he has a history of noise exposure as he was a  for many years.   Reported he has experienced hearing screenings in the past due to his employment and is not currently having any difficulty with his hearing.   Stated he has noticed some itching in his ears at times.   Reported he currently has some sinus pressure that is he concerned with.   Denied any otalgia, aural fullness, ear pressure, dizziness/vertigo, ear surgery, recent ear/sinus infections, recent falls, ear drainage, or sudden hearing loss.    EVALUATION      See Audiogram    RESULTS:    Otoscopic Evaluation:   Right Ear: Otoscopy revealed a clear healthy canal and a healthy tympanic membrane was visualized.   Left Ear: Otoscopy revealed a clear healthy canal and a healthy tympanic membrane was visualized.     Immittance:  Immittance Measures: 226 Hz   Right Ear: Tympanometric testing revealed a normal type A tympanogram with normal middle ear pressure and normal static compliance.  Left Ear: Tympanometric testing revealed a normal type A tympanogram with normal middle ear pressure and normal static compliance.    Right Ear: Ipsilateral acoustic reflexes were present at, 500-4,000 Hz, at expected sensation levels.  Left Ear: Ipsilateral acoustic  reflexes were present at, 500-2,000 Hz, at expected sensation levels and absent at 4,000 Hz.    Test technique:  Pure Tone Audiometry via insert earphones    Reliability:   excellent    Pure Tone Audiometry:    Right Ear: Audiometric testing indicated normal peripheral hearing sensitivity through 3,000 Hz, sloping slight to a mild notched sensorineural hearing loss centered around 4,000 Hz, rising to normal peripheral hearing sensitivity above.  Left Ear:   Audiometric testing indicated normal peripheral hearing sensitivity through 3,000 Hz, sloping slight to a mild notched sensorineural hearing loss centered around 4,000 Hz, rising to normal peripheral hearing sensitivity above.      Speech Audiometry:   Right Ear:  Speech Reception Threshold (SRT) was obtained at 10 dBHL                 Word Recognition scores were excellent (96%) in quiet when words were presented at 50 dBHL  Left Ear:  Speech Reception Threshold (SRT) was obtained at 10 dBHL                 Word Recognition scores were excellent (100%) in quiet when words were presented at 50 dBHL  Testing was performed with recorded NU-6 speech words in quiet. Speech thresholds were in good agreement with the pure tone averages in each ear.     IMPRESSIONS:  Today's test results are  a mild notched sensorineural hearing loss centered around 4,000 Hz only in each ear .  The patient was counseled with regard to the findings.    RECOMMENDATIONS:  * Continue medical follow up with Ang Bautista D.O.  * Retest as medically indicated, or sooner if a change in hearing sensitivity or tinnitus is noticed.   * Wear hearing protection while in the presence of loud sounds.   * Use tinnitus coping strategies as needed, such as sound apps on a smart phone, utilizing calming noise in the room, running a fan at night, etc.   * Use effective communication strategies such as asking the speaker to gain attention prior to speaking, speaking in the same room, repeating words that  were heard, etc.    PATIENT EDUCATION:   Discussed results and recommendations with the patient.  Questions were addressed and the patient was encouraged to contact our department should concerns arise.  The patient was seen from  11:00-11:30 am.

## 2024-01-05 NOTE — TELEPHONE ENCOUNTER
The following patients need to be rescheduled with a hearing test with Padmini Best just prior to their appointments with Dr. Will:    Gary Contreras  MRN:  [id number]  Reason:  Tinnitus requires Audio    His appointment is scheduled for 1/11 at 9:45.    *The above appointments will need to be cancelled as we are booking at the end of the month for dual audio/ENT appointments.  Please let me know if you have any questions about scheduling back to back appointments with an audio.     CALLED AND LMOM FOR PATIENT TO CALL DR WILL'S OFFICE TO HAVE HIS APPOINTMENT RESCHEDULED DUE TO HE NEEDS TO SEE THE AUDIOLOGIST JUST PRIOR TO SEEING DR. WILL.

## 2024-01-05 NOTE — LETTER
2024     Tutu Fay DO  [address] [facility] [address]    Patient: Gary Contreras   YOB: 1955   Date of Visit: 2024       Dear Dr. Tutu Fay DO:    Thank you for referring Gary Contreras to me for evaluation. Below are my notes for this consultation.  If you have questions, please do not hesitate to call me. I look forward to following your patient along with you.       Sincerely,     FLOR Anguiano, CCC-A      CC: No Recipients  ______________________________________________________________________________________    AUDIOLOGY ADULT AUDIOMETRIC EVALUATION      Name:  Gary Contreras  :  1955  Age:  68 y.o.  Date of Evaluation: 24    History:  Reason for visit:  Mr. Gary Contreras was seen today as part of the visit with Ang Bautista D.O. for an evaluation of hearing.   His current primary care physician is Tutu Fay DO   Chief Complaint   Patient presents with   • Tinnitus     The patient stated he has experienced constant bilateral non-pulsatile tinnitus for many years. Reported the tinnitus typically sounds like a constant running noise type of sound. Denied any significant difficulty sleeping due to the tinnitus and stated it does not interfere with his daily living activities.   Mentioned he has a history of noise exposure as he was a  for many years.   Reported he has experienced hearing screenings in the past due to his employment and is not currently having any difficulty with his hearing.   Stated he has noticed some itching in his ears at times.   Reported he currently has some sinus pressure that is he concerned with.   Denied any otalgia, aural fullness, ear pressure, dizziness/vertigo, ear surgery, recent ear/sinus infections, recent falls, ear drainage, or sudden hearing loss.    EVALUATION      See Audiogram    RESULTS:    Otoscopic Evaluation:   Right Ear: Otoscopy revealed a clear healthy canal and a healthy  tympanic membrane was visualized.   Left Ear: Otoscopy revealed a clear healthy canal and a healthy tympanic membrane was visualized.     Immittance:  Immittance Measures: 226 Hz   Right Ear: Tympanometric testing revealed a normal type A tympanogram with normal middle ear pressure and normal static compliance.  Left Ear: Tympanometric testing revealed a normal type A tympanogram with normal middle ear pressure and normal static compliance.    Right Ear: Ipsilateral acoustic reflexes were present at, 500-4,000 Hz, at expected sensation levels.  Left Ear: Ipsilateral acoustic reflexes were present at, 500-2,000 Hz, at expected sensation levels and absent at 4,000 Hz.    Test technique:  Pure Tone Audiometry via insert earphones    Reliability:   excellent    Pure Tone Audiometry:    Right Ear: Audiometric testing indicated normal peripheral hearing sensitivity through 3,000 Hz, sloping slight to a mild notched sensorineural hearing loss centered around 4,000 Hz, rising to normal peripheral hearing sensitivity above.  Left Ear:   Audiometric testing indicated normal peripheral hearing sensitivity through 3,000 Hz, sloping slight to a mild notched sensorineural hearing loss centered around 4,000 Hz, rising to normal peripheral hearing sensitivity above.      Speech Audiometry:   Right Ear:  Speech Reception Threshold (SRT) was obtained at 10 dBHL                 Word Recognition scores were excellent (96%) in quiet when words were presented at 50 dBHL  Left Ear:  Speech Reception Threshold (SRT) was obtained at 10 dBHL                 Word Recognition scores were excellent (100%) in quiet when words were presented at 50 dBHL  Testing was performed with recorded NU-6 speech words in quiet. Speech thresholds were in good agreement with the pure tone averages in each ear.     IMPRESSIONS:  Today's test results are  a mild notched sensorineural hearing loss centered around 4,000 Hz only in each ear .  The patient was  counseled with regard to the findings.    RECOMMENDATIONS:  * Continue medical follow up with Ang Bautista D.O.  * Retest as medically indicated, or sooner if a change in hearing sensitivity or tinnitus is noticed.   * Wear hearing protection while in the presence of loud sounds.   * Use tinnitus coping strategies as needed, such as sound apps on a smart phone, utilizing calming noise in the room, running a fan at night, etc.   * Use effective communication strategies such as asking the speaker to gain attention prior to speaking, speaking in the same room, repeating words that were heard, etc.    PATIENT EDUCATION:   Discussed results and recommendations with the patient.  Questions were addressed and the patient was encouraged to contact our department should concerns arise.  The patient was seen from  11:00-11:30 am.

## 2024-01-08 DIAGNOSIS — J44.9 CHRONIC OBSTRUCTIVE PULMONARY DISEASE, UNSPECIFIED COPD TYPE (MULTI): ICD-10-CM

## 2024-01-08 RX ORDER — BUDESONIDE AND FORMOTEROL FUMARATE DIHYDRATE 160; 4.5 UG/1; UG/1
2 AEROSOL RESPIRATORY (INHALATION)
Qty: 1 EACH | Refills: 1 | Status: SHIPPED | OUTPATIENT
Start: 2024-01-08 | End: 2024-04-05

## 2024-01-08 NOTE — TELEPHONE ENCOUNTER
Patient is calling because he saw you on 24 and forgot to ask for another rx for Breztri inahler (or anything that might be cheaper)    Rx Refill Request Telephone Encounter    Name:  Gary Contreras  : 1955     Medication Name:  Breztri  Dose (Optional):    160-9-4.8 mcg/ ACT  Quantity (Optional):    #1 (5.9 GM  inhaler)  Directions (Optional):   Inhale 2 puffs 2 X per day than rinse mouth    ALLERGIES:   nkda     Specific Pharmacy location:  [facility]    Date of last appointment:  24  Date of next appointment:  none    Best number to reach patient:  [phone]

## 2024-01-10 ENCOUNTER — TELEPHONE (OUTPATIENT)
Dept: PRIMARY CARE | Facility: CLINIC | Age: 69
End: 2024-01-10
Payer: MEDICARE

## 2024-01-10 ENCOUNTER — ANCILLARY PROCEDURE (OUTPATIENT)
Dept: RADIOLOGY | Facility: CLINIC | Age: 69
End: 2024-01-10
Payer: MEDICARE

## 2024-01-10 DIAGNOSIS — Z87.891 FORMER SMOKER: ICD-10-CM

## 2024-01-10 DIAGNOSIS — J32.9 CHRONIC SINUSITIS, UNSPECIFIED LOCATION: ICD-10-CM

## 2024-01-10 DIAGNOSIS — J01.11 ACUTE RECURRENT FRONTAL SINUSITIS: ICD-10-CM

## 2024-01-10 DIAGNOSIS — J40 BRONCHITIS: Primary | ICD-10-CM

## 2024-01-10 DIAGNOSIS — R51.9 ACUTE INTRACTABLE HEADACHE, UNSPECIFIED HEADACHE TYPE: ICD-10-CM

## 2024-01-10 PROCEDURE — 71271 CT THORAX LUNG CANCER SCR C-: CPT

## 2024-01-10 PROCEDURE — 70486 CT MAXILLOFACIAL W/O DYE: CPT

## 2024-01-10 PROCEDURE — 71271 CT THORAX LUNG CANCER SCR C-: CPT | Performed by: RADIOLOGY

## 2024-01-10 PROCEDURE — 70450 CT HEAD/BRAIN W/O DYE: CPT

## 2024-01-10 PROCEDURE — 70450 CT HEAD/BRAIN W/O DYE: CPT | Performed by: RADIOLOGY

## 2024-01-10 NOTE — TELEPHONE ENCOUNTER
----- Message from Tutu Fay DO sent at 1/10/2024  1:39 PM EST -----  Brain scan was normal sinus CAT scan shows,, old fractures,, and deviated septum with some congestion,, the implants look okay,, I would have him see Dr. Bautista the ENT doctor get a second opinion after anything else he needs opened up sometimes they can do suction of polyps out of that area, but we should get a second opinion

## 2024-01-11 ENCOUNTER — TELEPHONE (OUTPATIENT)
Dept: PRIMARY CARE | Facility: CLINIC | Age: 69
End: 2024-01-11

## 2024-01-11 ENCOUNTER — OFFICE VISIT (OUTPATIENT)
Dept: OTOLARYNGOLOGY | Facility: CLINIC | Age: 69
End: 2024-01-11
Payer: MEDICARE

## 2024-01-11 VITALS
HEIGHT: 72 IN | DIASTOLIC BLOOD PRESSURE: 88 MMHG | SYSTOLIC BLOOD PRESSURE: 144 MMHG | WEIGHT: 194 LBS | BODY MASS INDEX: 26.28 KG/M2 | TEMPERATURE: 97.4 F

## 2024-01-11 DIAGNOSIS — M54.2 NECK PAIN: ICD-10-CM

## 2024-01-11 DIAGNOSIS — J30.9 CHRONIC ALLERGIC RHINITIS: ICD-10-CM

## 2024-01-11 DIAGNOSIS — J44.9 COPD (CHRONIC OBSTRUCTIVE PULMONARY DISEASE) (MULTI): ICD-10-CM

## 2024-01-11 DIAGNOSIS — L02.92 BOIL: Primary | ICD-10-CM

## 2024-01-11 DIAGNOSIS — L72.0 EPIDERMAL INCLUSION CYST: ICD-10-CM

## 2024-01-11 DIAGNOSIS — R91.1 LUNG NODULE: ICD-10-CM

## 2024-01-11 DIAGNOSIS — H90.3 BILATERAL HIGH FREQUENCY SENSORINEURAL HEARING LOSS: ICD-10-CM

## 2024-01-11 DIAGNOSIS — H93.13 TINNITUS OF BOTH EARS: Primary | ICD-10-CM

## 2024-01-11 PROCEDURE — 99204 OFFICE O/P NEW MOD 45 MIN: CPT | Performed by: OTOLARYNGOLOGY

## 2024-01-11 PROCEDURE — 3008F BODY MASS INDEX DOCD: CPT | Performed by: OTOLARYNGOLOGY

## 2024-01-11 PROCEDURE — 1159F MED LIST DOCD IN RCRD: CPT | Performed by: OTOLARYNGOLOGY

## 2024-01-11 PROCEDURE — 1126F AMNT PAIN NOTED NONE PRSNT: CPT | Performed by: OTOLARYNGOLOGY

## 2024-01-11 PROCEDURE — 1036F TOBACCO NON-USER: CPT | Performed by: OTOLARYNGOLOGY

## 2024-01-11 PROCEDURE — 1160F RVW MEDS BY RX/DR IN RCRD: CPT | Performed by: OTOLARYNGOLOGY

## 2024-01-11 RX ORDER — LEVOFLOXACIN 500 MG/1
500 TABLET, FILM COATED ORAL DAILY
Qty: 10 TABLET | Refills: 0 | Status: SHIPPED | OUTPATIENT
Start: 2024-01-11 | End: 2024-01-21

## 2024-01-11 NOTE — PROGRESS NOTES
Impression:  1. Tinnitus of both ears        2. Bilateral high frequency sensorineural hearing loss  Referral to ENT      3. Epidermal inclusion cyst        4. Chronic allergic rhinitis        5. Neck pain              RECOMMENDATIONS/PLAN :  I reviewed the patient's audiogram with the patient and he does have a mild high-frequency sensorineural loss in both ears and this is most likely the source of his ringing in the ears.  We discussed various masking techniques to help cover up the ringing and I gave him a pamphlet that he can look through.  He will continue to protect his hearing from any future loud noise exposure.  I did review his previous CT scan of the sinuses and reassured him that there is nothing that would require surgical intervention.  He had some chronic thickening from one of his dental implants and evidence of previous fracture from a previous trauma when he was younger.  He had some secretions in his sphenoid sinus from a previous acute infection but no significant chronic changes.  I advised him to start rinsing his nose and sinuses with a sinus rinse kit and if he continues to have congestion, we will start him on Flonase nasal spray-2 puffs each nostril daily.  He will continue to monitor the swelling along his posterior neck and if it becomes erythematous or if he starts running a fever or if it starts draining pus, we will start oral antibiotics.  I did mention that this is most consistent with an epidermal inclusion cyst and it would require surgery to remove this.  He is considering that in the future.  All of his concerns were addressed today.      **This electronic medical record note was created with the use of voice recognition software.  Despite proofreading, typographical or grammatical errors may be present that could affect meaning of content **    Subjective   Patient ID:     Gary Contreras is a 68 y.o. male who presents to the office with multiple complaints.  He has a  nonpulsating type ringing in the ears and he has had previous loud noise exposure with his work.  He denies any vertigo or neurologic complaints.  He is also complaining of nasal congestion with rhinorrhea.  He also states that his neck feels full and he has a history of reflux and persistent throat clearing.  He also has a cystic type mass along his posterior neck.    ROS:  A detailed 12 system review of systems is noted on the intake form has been reviewed with the patient with details noted in the HPI and scanned into the patient's medical record.    Objective     Past Medical History:   Diagnosis Date    Personal history of other diseases of the respiratory system 08/26/2019    History of sinusitis        History reviewed. No pertinent surgical history.     No Known Allergies       Current Outpatient Medications:     pantoprazole (ProtoNix) 40 mg EC tablet, TAKE 1 TABLET BY MOUTH EVERY DAY, Disp: 90 tablet, Rfl: 0    budesonide-formoteroL (Symbicort) 160-4.5 mcg/actuation inhaler, Inhale 2 puffs 2 times a day. Rinse mouth with water after use to reduce aftertaste and incidence of candidiasis. Do not swallow., Disp: 1 each, Rfl: 1    budesonide-glycopyr-formoterol (BREZTRI) 160-9-4.8 mcg/actuation HFA aerosol inhaler, Inhale 2 puffs 2 times a day., Disp: , Rfl:     clotrimazole-betamethasone (Lotrisone) cream, Apply twice daily to outer lower lip for cheilitis, Disp: 15 g, Rfl: 1    levoFLOXacin (Levaquin) 500 mg tablet, Take 1 tablet (500 mg) by mouth once daily for 10 days., Disp: 10 tablet, Rfl: 0     Tobacco Use: Medium Risk (1/11/2024)    Patient History     Smoking Tobacco Use: Former     Smokeless Tobacco Use: Former     Passive Exposure: Not on file        Alcohol Use: Not on file        Social History     Substance and Sexual Activity   Drug Use Never        Physical Exam:  Visit Vitals  /88   Temp 36.3 °C (97.4 °F) (Temporal)   Ht 1.829 m (6')   Wt 88 kg (194 lb)   BMI 26.31 kg/m²   Smoking Status  Former   BSA 2.11 m²      General: Patient is alert, oriented, cooperative in no apparent distress.  Head: Normocephalic, atraumatic.  Eyes: PERRL, EOMI, Conjunctiva is clear. No nystagmus.  Ears: Right Ear-- Pinna is normal.  External auditory canal is patent. Tympanic membrane is [intact, translucent and has good mobility with my pneumatic otoscope. No effusion].  Mastoid is nontender.  Left ear-- Pinna is normal.  External auditory canal is patent. Tympanic membrane is [intact, translucent and has good mobility with my pneumatic otoscope.  No effusion].  Mastoid is nontender.  Nose: Septum is relatively straight.  No septal perforation or lesions. No septal hematoma/ seroma.  No signs of bleeding.  Inferior turbinates are moderately swollen.   No evidence of intranasal polyps.  No infectious drainage.  Throat:  Floor of mouth is clear, no masses.  Tongue appears normal, no lesions or masses. Gums, gingiva, buccal mucosa appear pink and moist, no lesions. Teeth are in fair repair.  No obvious dental infections.  Peritonsillar regions appear symmetric without swelling.  Hard and soft palate appear normal, no obvious cleft. Uvula is midline.  Oropharynx: No lesions. Retropharyngeal wall is flat.  Minimal clear postnasal drip.  Neck: Supple,  no lymphadenopathy.  Along his posterior neck it appears that he has an epidermal inclusion cyst that is approximately 1.5 cm.  No evidence of acute inflammation or infection today.  Salivary Glands: Symmetric bilaterally.  No palpable masses.  No evidence of acute infection or salivary stones  Neurologic: Cranial Nerves 2-12 are grossly intact without focal deficits. Cerebellar function testing is normal.     Results:   I reviewed his previous CT scan of his sinuses and there is no evidence of any chronic thickening.  He did have some frothy secretions in his sphenoid sinus but the remainder of his sinuses were clear.  He had evidence of a previous fracture but no entrapment of  his extraocular muscles.    I reviewed his recent audiogram and he does have a mild high-frequency sensorineural loss in both ears.  His speech reception threshold was 10 dB bilaterally.  Word recognition scores 96% in the right ear and 100% in the left ear.  Tympanic membrane's had normal mobility on tympanometry.    Procedure:   []    Ang Bautista, DO

## 2024-01-11 NOTE — TELEPHONE ENCOUNTER
----- Message from Tutu Fay DO sent at 1/11/2024  9:56 AM EST -----  His CAT scan showed that his old nodules are fine and stable,,, but he has a lot of congestion in his right upper and middle lobe almost as if he choked and had fluid go into his lungs,, which recall aspiration infections,,, I sent in an antibiotic for him,, and we need to repeat the same scan in 3 months, to make sure that all that inflammation has resolved, the radiologist was more concerned regarding infection than any type of nodule which is great

## 2024-01-26 RX ORDER — AMOXICILLIN AND CLAVULANATE POTASSIUM 875; 125 MG/1; MG/1
875 TABLET, FILM COATED ORAL 2 TIMES DAILY
Qty: 20 TABLET | Refills: 0 | Status: SHIPPED | OUTPATIENT
Start: 2024-01-26 | End: 2024-02-05

## 2024-01-26 NOTE — TELEPHONE ENCOUNTER
Patient is calling because he had the CT lung done 3 weeks ago, got an antibiotic and was told to get a follow up CT scan. He is still having symptoms, congestion and fatigue. Wanted to know if you could order the CT scan (although it states to repeat it in 3 months)?    He also states he's been dealing with a boil on the back of his neck for about 3 weeks and he's been putting hot compresses on it and it's not helping. He wanted to know if you had any advice on what he should do, who can you refer him to for this?    PLEASE ADDRESS BOTH MESSAGES    Thanks    Patient's # [phone]    (If ok for CT scan, he would like to go to [facility] again on [address])

## 2024-01-26 NOTE — TELEPHONE ENCOUNTER
Patient aware  Was given pulmonologist, Dr Trujillo's info and he will call to set up appointment.

## 2024-03-09 DIAGNOSIS — K21.9 GASTRO-ESOPHAGEAL REFLUX DISEASE WITHOUT ESOPHAGITIS: ICD-10-CM

## 2024-03-11 RX ORDER — PANTOPRAZOLE SODIUM 40 MG/1
TABLET, DELAYED RELEASE ORAL
Qty: 90 TABLET | Refills: 0 | Status: SHIPPED | OUTPATIENT
Start: 2024-03-11 | End: 2024-06-07

## 2024-03-21 ENCOUNTER — TELEPHONE (OUTPATIENT)
Dept: PRIMARY CARE | Facility: CLINIC | Age: 69
End: 2024-03-21
Payer: MEDICARE

## 2024-03-21 DIAGNOSIS — R91.1 LUNG NODULE SEEN ON IMAGING STUDY: Primary | ICD-10-CM

## 2024-03-21 NOTE — TELEPHONE ENCOUNTER
Patient called about receiving letter in the mail from  stating that he is due for another CT of the lungs. Mail told him to call his PCP and it gave a number for radiology. I told him I would give you a message and he will call over to radiology.     Patient wanted to know if he should schedule another ct? Please advise and thank you

## 2024-03-21 NOTE — TELEPHONE ENCOUNTER
Patient called back. He said it was a low dose chest ct.     Patient wants to know if he needs this?   Please advise and thank you

## 2024-04-02 NOTE — PROGRESS NOTES
"Subjective   Patient ID: Gary Contreras is a 68 y.o. male who presents for Lung Nodule and COPD.  HPI  Mr. Contreras is a 68M PMH allergies, sinusitis, HLD, hearing loss, GERD, who is referred to pulmonary for COPD and lung nodule.     #Dyspnea  Tried symbicort, breztri, trelegy, anoro ellipta, albuterol in the past. Discussed with his PCP and patient is anxious about diagnoses but also does not adhere to therapy very long.  Patient reports chronic sinus congestion, and GERD. He saw ENT once and they focused on tinnitus.   Had imaging done in 1/2024  He has been feeling SOB for 6 months. He does not exercise because he has poor energy for past 4 months.  He has a lot eye pressure too.  CT sinus showed bony lesion and fracture in this area.   He has been coughing and sneezing quite a bit for past 4 months. He does not use any allergy medications for this. He has not tried any flonase in the past, he has not seen an allergist.   He reports no symptoms of URI. He reports being up to date on vaccines just not RSV.   He thinks he could walk at least 100 meters.   He has been cat sitting for past 4 months but doesn't know if he had a change in his symptoms.  He struggles with remembering details related to his health frequently saying \"I don't know\".     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 Negative for appetite change, chills, diaphoresis, fever and unexpected weight change.   HENT:  Positive for congestion, hearing loss, postnasal drip, rhinorrhea, sinus pressure, sinus pain, sneezing, sore throat, tinnitus, trouble swallowing and voice change.         Describes more neck tightness than trouble swallowing  Voice cracks per patient,   Eyes:  " Positive for pain.   Respiratory:  Positive for cough, chest tightness, shortness of breath and wheezing.         Wheezing in AM   Cardiovascular:  Negative for chest pain, palpitations and leg swelling.   Gastrointestinal:  Negative for nausea.   Skin:  Negative for rash.        Subjective redness to chest   Allergic/Immunologic: Positive for environmental allergies.   Hematological:  Negative for adenopathy.       Objective   Physical Exam  Vitals reviewed.   Constitutional:       Appearance: Normal appearance.   HENT:      Head: Normocephalic and atraumatic.      Nose: Rhinorrhea present.      Mouth/Throat:      Mouth: Mucous membranes are moist.      Pharynx: Posterior oropharyngeal erythema present.   Eyes:      Extraocular Movements: Extraocular movements intact.   Cardiovascular:      Rate and Rhythm: Normal rate and regular rhythm.      Heart sounds: No murmur heard.  Pulmonary:      Effort: Pulmonary effort is normal.      Breath sounds: No wheezing or rhonchi.   Abdominal:      Palpations: Abdomen is soft.   Musculoskeletal:      Cervical back: Neck supple. No tenderness.      Right lower leg: No edema.      Left lower leg: No edema.   Lymphadenopathy:      Cervical: No cervical adenopathy.   Skin:     Comments: Scattered erythema   Neurological:      General: No focal deficit present.      Mental Status: He is alert and oriented to person, place, and time.   Psychiatric:      Comments: anxious         No PFT    CT chest 1/10/24  FINDINGS:  LUNGS AND AIRWAYS:  The trachea and central airways are patent. No endobronchial lesion  is seen.  There is mild bilateral upper lung predominant centrilobular and  paraseptal emphysema.There is no focal consolidation, pleural  effusion, or pneumothorax.  Multiple areas of ground-glass opacity and tree-in-bud nodularity in  the right upper and right middle lobe, new compared to prior study.  3 mm right lower lobe nodule, image 134/379, stable.  4 mm right lower lobe  nodule, image 240/379, stable.  5 mm right lower lobe nodule, image 269/379, stable.  MEDIASTINUM AND GENI, LOWER NECK AND AXILLA:  The visualized thyroid gland is within normal limits.  No evidence of thoracic lymphadenopathy by CT criteria.  Esophagus appears within normal limits as seen.  HEART AND VESSELS:  Ectatic ascending thoracic aorta measuring 3.9 cm.There is mild  scattered calcified atherosclerosis present. Main pulmonary artery  and its branches are normal in caliber. Mild coronary artery  calcifications are seen. Please note,the study is not optimized for  evaluation of coronary arteries. The cardiac chambers are not  enlarged. There is no pericardial effusion seen.  UPPER ABDOMEN:  1.5 cm hypodense lesion in the right kidney, likely cyst  CHEST WALL AND OSSEOUS STRUCTURES:  Chest wall is within normal limits.  No acute osseous pathology.There are no suspicious osseous lesions.      IMPRESSION:  1. Multiple areas of ground-glass opacity and tree-in-bud nodularity  predominantly in the right upper and right middle lobe, likely  inflammatory/infectious in etiology. Recommend short-term follow-up  chest CT in 3 months to confirm resolution and establish a new  baseline.  2. Scattered predominantly right-sided pulmonary nodules measuring to 5 mm, stable and as described above.  3. Mild upper lung predominant emphysema.  4. Mild coronary artery calcification.  5. Other findings as described above    CT sinus 1/10/2024  IMPRESSION:  *Polypoid mucosal thickening left sphenoid sinus with mineralized  inspissated mucus secretions *Previous fracture right medial orbital  wall without muscle entrapment *Likely postoperative bony hypertrophy  in the right alveolar recess secondary to dental implant versus  osteoma    Assessment/Plan   Diagnoses and all orders for this visit:  Dyspnea on exertion  -     Complete Pulmonary Function Test Pre/Post Bronchodialator (Spirometry Pre/Post/DLCO/Lung Volumes); Future  -      Pulmonary Stress Test (6 Min. Walk); Future  -     Transthoracic Echo (TTE) Complete; Future  -     CBC and Auto Differential; Future  -     TSH with reflex to Free T4 if abnormal; Future  -     albuterol 90 mcg/actuation inhaler; Inhale 2 puffs every 6 hours if needed for wheezing.  Allergy, initial encounter  -     Respiratory Allergy Profile IgE; Future  -     fluticasone (Flonase) 50 mcg/actuation nasal spray; Administer 2 sprays into each nostril once daily. Shake gently. Before first use, prime pump. After use, clean tip and replace cap.  -     cetirizine (ZyrTEC) 10 mg tablet; Take 1 tablet (10 mg) by mouth once daily.  Sinusitis, unspecified chronicity, unspecified location  -     Referral to ENT; Future  -     fluticasone (Flonase) 50 mcg/actuation nasal spray; Administer 2 sprays into each nostril once daily. Shake gently. Before first use, prime pump. After use, clean tip and replace cap.  -     cetirizine (ZyrTEC) 10 mg tablet; Take 1 tablet (10 mg) by mouth once daily.  Gastroesophageal reflux disease, unspecified whether esophagitis present  -     Referral to Gastroenterology; Future  Other orders  -     perflutren lipid microspheres (Definity) injection 0.5-10 mL of dilution  -     sulfur hexafluoride microsphr (Lumason) injection 24.28 mg  -     perflutren protein A microsphere (Optison) injection 0.5 mL      Mr. Contreras is a 68M PMH allergies, sinusitis, HLD, hearing loss, GERD, who is referred to pulmonary for COPD and lung nodule.     #Lung nodule  #Former tobacco user  Patient with small subcentimeter nodules on imaging that can be followed annually.  He had mostly RUL GGO and tree-in-bud nodules suspicious for infectious/inflammatory causes. Repeat imaging planned already for 4/22. Will see patient after to decide on further imaging.     #Dyspnea on exertion  #Cough  Patient anxious about diagnoses and has tried therapies in the past but stopped for unknown reasons. He isn't able to recall much  about what prompted him to stop medications despite on-going symptoms. Occasionally he is able to tell me it did not improve. He then asks a variety of questions about symptoms and is overwhelmed with any assessment I give him.   At this time, I am suspicious of possible obstructive lung disease and ordered PFTs and 6MWT to assess for this.   He could also have cardiac causes for his dyspnea and TTE recommended.   He has chronic sinus inflammation and distorted anatomy and I re-iterated prior advice by PCP for patient to return to ENT for evaluation as he reports chronic sinus pressure and eye pain from this.   I ordered flonase and claritin for upper airway cough treatment and advised him to re-start his PPI for GERD. We did discuss general GERD management as well as he was having frequent belching and discomfort.   - PFT, 6MWT, TTE  - CBC, TSH, resp allergy panel for SOB and cough  - CT chest 4/22/24  - start albuterol, flonase, claritin, restart PPI    RTC 4-6 weeks     Guerita Rodrigues MD 04/05/24 5:13 PM

## 2024-04-05 ENCOUNTER — OFFICE VISIT (OUTPATIENT)
Dept: PULMONOLOGY | Facility: CLINIC | Age: 69
End: 2024-04-05
Payer: MEDICARE

## 2024-04-05 ENCOUNTER — LAB (OUTPATIENT)
Dept: LAB | Facility: LAB | Age: 69
End: 2024-04-05
Payer: MEDICARE

## 2024-04-05 VITALS
HEART RATE: 71 BPM | WEIGHT: 198.4 LBS | OXYGEN SATURATION: 96 % | RESPIRATION RATE: 18 BRPM | DIASTOLIC BLOOD PRESSURE: 81 MMHG | HEIGHT: 72 IN | SYSTOLIC BLOOD PRESSURE: 124 MMHG | BODY MASS INDEX: 26.87 KG/M2

## 2024-04-05 DIAGNOSIS — J32.9 SINUSITIS, UNSPECIFIED CHRONICITY, UNSPECIFIED LOCATION: ICD-10-CM

## 2024-04-05 DIAGNOSIS — K21.9 GASTROESOPHAGEAL REFLUX DISEASE, UNSPECIFIED WHETHER ESOPHAGITIS PRESENT: ICD-10-CM

## 2024-04-05 DIAGNOSIS — R06.09 DYSPNEA ON EXERTION: ICD-10-CM

## 2024-04-05 DIAGNOSIS — T78.40XA ALLERGY, INITIAL ENCOUNTER: ICD-10-CM

## 2024-04-05 DIAGNOSIS — R06.09 DYSPNEA ON EXERTION: Primary | ICD-10-CM

## 2024-04-05 LAB
BASOPHILS # BLD AUTO: 0.09 X10*3/UL (ref 0–0.1)
BASOPHILS NFR BLD AUTO: 1.2 %
EOSINOPHIL # BLD AUTO: 0.11 X10*3/UL (ref 0–0.7)
EOSINOPHIL NFR BLD AUTO: 1.5 %
ERYTHROCYTE [DISTWIDTH] IN BLOOD BY AUTOMATED COUNT: 12.9 % (ref 11.5–14.5)
HCT VFR BLD AUTO: 41.5 % (ref 41–52)
HGB BLD-MCNC: 13.7 G/DL (ref 13.5–17.5)
IMM GRANULOCYTES # BLD AUTO: 0.03 X10*3/UL (ref 0–0.7)
IMM GRANULOCYTES NFR BLD AUTO: 0.4 % (ref 0–0.9)
LYMPHOCYTES # BLD AUTO: 2.07 X10*3/UL (ref 1.2–4.8)
LYMPHOCYTES NFR BLD AUTO: 28.6 %
MCH RBC QN AUTO: 30.2 PG (ref 26–34)
MCHC RBC AUTO-ENTMCNC: 33 G/DL (ref 32–36)
MCV RBC AUTO: 92 FL (ref 80–100)
MONOCYTES # BLD AUTO: 0.74 X10*3/UL (ref 0.1–1)
MONOCYTES NFR BLD AUTO: 10.2 %
NEUTROPHILS # BLD AUTO: 4.21 X10*3/UL (ref 1.2–7.7)
NEUTROPHILS NFR BLD AUTO: 58.1 %
NRBC BLD-RTO: 0 /100 WBCS (ref 0–0)
PLATELET # BLD AUTO: 268 X10*3/UL (ref 150–450)
RBC # BLD AUTO: 4.53 X10*6/UL (ref 4.5–5.9)
TSH SERPL-ACNC: 2.92 MIU/L (ref 0.44–3.98)
WBC # BLD AUTO: 7.3 X10*3/UL (ref 4.4–11.3)

## 2024-04-05 PROCEDURE — 36415 COLL VENOUS BLD VENIPUNCTURE: CPT

## 2024-04-05 PROCEDURE — 1036F TOBACCO NON-USER: CPT | Performed by: INTERNAL MEDICINE

## 2024-04-05 PROCEDURE — 85025 COMPLETE CBC W/AUTO DIFF WBC: CPT

## 2024-04-05 PROCEDURE — 84443 ASSAY THYROID STIM HORMONE: CPT

## 2024-04-05 PROCEDURE — 86003 ALLG SPEC IGE CRUDE XTRC EA: CPT

## 2024-04-05 PROCEDURE — 99204 OFFICE O/P NEW MOD 45 MIN: CPT | Performed by: INTERNAL MEDICINE

## 2024-04-05 PROCEDURE — 82785 ASSAY OF IGE: CPT

## 2024-04-05 PROCEDURE — 3008F BODY MASS INDEX DOCD: CPT | Performed by: INTERNAL MEDICINE

## 2024-04-05 PROCEDURE — 1160F RVW MEDS BY RX/DR IN RCRD: CPT | Performed by: INTERNAL MEDICINE

## 2024-04-05 PROCEDURE — 1159F MED LIST DOCD IN RCRD: CPT | Performed by: INTERNAL MEDICINE

## 2024-04-05 RX ORDER — FLUTICASONE PROPIONATE 50 MCG
2 SPRAY, SUSPENSION (ML) NASAL DAILY
Qty: 16 G | Refills: 6 | Status: SHIPPED | OUTPATIENT
Start: 2024-04-05 | End: 2024-10-02

## 2024-04-05 RX ORDER — ALBUTEROL SULFATE 90 UG/1
2 AEROSOL, METERED RESPIRATORY (INHALATION) EVERY 6 HOURS PRN
Qty: 18 G | Refills: 11 | Status: SHIPPED | OUTPATIENT
Start: 2024-04-05 | End: 2025-04-05

## 2024-04-05 RX ORDER — CETIRIZINE HYDROCHLORIDE 10 MG/1
10 TABLET ORAL DAILY
Qty: 30 TABLET | Refills: 11 | Status: SHIPPED | OUTPATIENT
Start: 2024-04-05 | End: 2025-04-05

## 2024-04-05 ASSESSMENT — ENCOUNTER SYMPTOMS
VOICE CHANGE: 1
RHINORRHEA: 1
SORE THROAT: 1
UNEXPECTED WEIGHT CHANGE: 0
TROUBLE SWALLOWING: 1
ACTIVITY CHANGE: 1
CHILLS: 0
SHORTNESS OF BREATH: 1
COUGH: 1
DIAPHORESIS: 0
PALPITATIONS: 0
WHEEZING: 1
SINUS PRESSURE: 1
APPETITE CHANGE: 0
FATIGUE: 1
FEVER: 0
SINUS PAIN: 1
EYE PAIN: 1
LOSS OF SENSATION IN FEET: 0
CHEST TIGHTNESS: 1
ADENOPATHY: 0
DEPRESSION: 0
NAUSEA: 0
OCCASIONAL FEELINGS OF UNSTEADINESS: 0

## 2024-04-05 ASSESSMENT — COLUMBIA-SUICIDE SEVERITY RATING SCALE - C-SSRS
2. HAVE YOU ACTUALLY HAD ANY THOUGHTS OF KILLING YOURSELF?: NO
1. IN THE PAST MONTH, HAVE YOU WISHED YOU WERE DEAD OR WISHED YOU COULD GO TO SLEEP AND NOT WAKE UP?: NO
6. HAVE YOU EVER DONE ANYTHING, STARTED TO DO ANYTHING, OR PREPARED TO DO ANYTHING TO END YOUR LIFE?: NO

## 2024-04-05 ASSESSMENT — PATIENT HEALTH QUESTIONNAIRE - PHQ9
2. FEELING DOWN, DEPRESSED OR HOPELESS: NOT AT ALL
SUM OF ALL RESPONSES TO PHQ9 QUESTIONS 1 AND 2: 0
1. LITTLE INTEREST OR PLEASURE IN DOING THINGS: NOT AT ALL

## 2024-04-05 NOTE — PATIENT INSTRUCTIONS
"Thank you for coming in today! Please call with questions or concerns.    Your CAT scan showed small nodules and some inflammation. We will see what happens to them on your repeat.   You have some emphysema but I don't know yet if you have COPD. We should do some testing for that. Once we know that, I can help pick inhalers for you to use to help the shortness of breath.   COPD is a slowly progressive disease that has symptoms of cough, wheezing, shortness of breath.  We treat it with inhalers, avoiding infection, getting regularly recommended vaccines, quitting smoking, and exercising. Without inhalers, you may continue to have shortness of breath.       Please schedule:  - Breathing tests - \"pulmonary function tests\". These tests check for COPD/asthma, lung volumes, and oxygen capacity. You can do these at any  medical center. [place] is [facility].   - Heart echo - this is an ultrasound that checks on the heart chambers, the heart valves, and the squeeze. You can do this at any  facility  - ENT evaluation - you have a lot of sinus issues that need to be looked at. There is a bony growth on your CT sinus that could be the cause of the eye pressure and sinus pain you are having.     Please start:  - albuterol - use this inhaler before or after you start feeling short of breath and tell me does it help  - flonase nasal spray - use this for sinus inflammation and allergies. Point towards your ear when you use it  - zyrtec - this is a tablet for allergies. Use this daily      For acid reflux - avoid onions, tomatoes, spicy foods, alcohol, chocolate, caffeine  Sit upright for 2 hours after eating  I put in an order for you to see the GI doctor if you would like    Return to clinic in 4-6 weeks to go over testing    Call [phone] to schedule tests    "

## 2024-04-09 LAB
A ALTERNATA IGE QN: 0.1 KU/L
A FUMIGATUS IGE QN: <0.1 KU/L
BERMUDA GRASS IGE QN: <0.1 KU/L
BOXELDER IGE QN: <0.1 KU/L
C HERBARUM IGE QN: <0.1 KU/L
CALIF WALNUT POLN IGE QN: <0.1 KU/L
CAT DANDER IGE QN: <0.1 KU/L
CMN PIGWEED IGE QN: <0.1 KU/L
COMMON RAGWEED IGE QN: <0.1 KU/L
COTTONWOOD IGE QN: <0.1 KU/L
D FARINAE IGE QN: 0.17 KU/L
D PTERONYSS IGE QN: 0.17 KU/L
DOG DANDER IGE QN: <0.1 KU/L
ENGL PLANTAIN IGE QN: <0.1 KU/L
GOOSEFOOT IGE QN: <0.1 KU/L
JOHNSON GRASS IGE QN: <0.1 KU/L
KENT BLUE GRASS IGE QN: <0.1 KU/L
LONDON PLANE IGE QN: <0.1 KU/L
MT JUNIPER IGE QN: <0.1 KU/L
P NOTATUM IGE QN: <0.1 KU/L
PECAN/HICK TREE IGE QN: <0.1 KU/L
ROACH IGE QN: <0.1 KU/L
SALTWORT IGE QN: <0.1 KU/L
SHEEP SORREL IGE QN: <0.1 KU/L
SILVER BIRCH IGE QN: <0.1 KU/L
TIMOTHY IGE QN: <0.1 KU/L
TOTAL IGE SMQN RAST: 27.5 KU/L
WHITE ASH IGE QN: <0.1 KU/L
WHITE ELM IGE QN: <0.1 KU/L
WHITE MULBERRY IGE QN: <0.1 KU/L
WHITE OAK IGE QN: <0.1 KU/L

## 2024-04-22 ENCOUNTER — HOSPITAL ENCOUNTER (OUTPATIENT)
Dept: RADIOLOGY | Facility: CLINIC | Age: 69
Discharge: HOME | End: 2024-04-22
Payer: MEDICARE

## 2024-04-22 DIAGNOSIS — R91.1 LUNG NODULE SEEN ON IMAGING STUDY: ICD-10-CM

## 2024-04-22 PROCEDURE — 71250 CT THORAX DX C-: CPT

## 2024-04-22 PROCEDURE — 71250 CT THORAX DX C-: CPT | Performed by: RADIOLOGY

## 2024-04-23 ENCOUNTER — OFFICE VISIT (OUTPATIENT)
Dept: OTOLARYNGOLOGY | Facility: CLINIC | Age: 69
End: 2024-04-23
Payer: MEDICARE

## 2024-04-23 VITALS — HEIGHT: 72 IN | WEIGHT: 200.9 LBS | BODY MASS INDEX: 27.21 KG/M2

## 2024-04-23 DIAGNOSIS — J32.3 CHRONIC SPHENOIDAL SINUSITIS: Primary | ICD-10-CM

## 2024-04-23 DIAGNOSIS — J32.9 SINUSITIS, UNSPECIFIED CHRONICITY, UNSPECIFIED LOCATION: ICD-10-CM

## 2024-04-23 PROCEDURE — 3008F BODY MASS INDEX DOCD: CPT | Performed by: OTOLARYNGOLOGY

## 2024-04-23 PROCEDURE — 1126F AMNT PAIN NOTED NONE PRSNT: CPT | Performed by: OTOLARYNGOLOGY

## 2024-04-23 PROCEDURE — 1160F RVW MEDS BY RX/DR IN RCRD: CPT | Performed by: OTOLARYNGOLOGY

## 2024-04-23 PROCEDURE — 1159F MED LIST DOCD IN RCRD: CPT | Performed by: OTOLARYNGOLOGY

## 2024-04-23 PROCEDURE — 99213 OFFICE O/P EST LOW 20 MIN: CPT | Performed by: OTOLARYNGOLOGY

## 2024-04-23 PROCEDURE — 31231 NASAL ENDOSCOPY DX: CPT | Performed by: OTOLARYNGOLOGY

## 2024-04-23 ASSESSMENT — PATIENT HEALTH QUESTIONNAIRE - PHQ9
2. FEELING DOWN, DEPRESSED OR HOPELESS: NOT AT ALL
1. LITTLE INTEREST OR PLEASURE IN DOING THINGS: NOT AT ALL
SUM OF ALL RESPONSES TO PHQ9 QUESTIONS 1 & 2: 0

## 2024-04-23 ASSESSMENT — PAIN SCALES - GENERAL: PAINLEVEL: 0-NO PAIN

## 2024-04-23 NOTE — PROGRESS NOTES
"Chief Complaint:  Chronic facial pressure, orbital pain    Referring Provider: Guerita Rodrigues MD    History of Present Illness:    Gary Contreras is a 68 y.o. male, presenting for evaluation of chronic facial pain, pressure, and orbital pain. Patients symptoms have been present for the last several months. His main symptom is bilateral eye pain and pressure across his cheeks and forehead. He reports nasal obstruction. He reports approximately one sinus infection per year, and this is usually preceded by redness over his nose. He was prescribed Flonase which he states he used and ran out in a week. He reports that he was using it three times daily, two sprays each time. He was also prescribed Zyrtec. While using the Flonase, he reports that his symptoms were improved. He denies any significant rhinorrhea, but that he blows his nose frequently. He continues to report bilateral tinnitus and neck tightness, which was reported to his previous ENT visit with Dr. Ang Bautista earlier this year. An audiogram performed at that time did not reveal any significant asymmetry. He was seen by an Oral Surgeon in [place] last year and reports that there was a complication with placement of an implant. He reports \"I wasn't sure if maybe he went too far into the sinus\" and that he dealt with a chronic infection.     Review of Systems:    Review of symptoms was negative except for those stated including Cardiopulmonary, Genitourinary, Gastrointestinal, Psychological, Sleep pattern, Endocrine, Eyes, Neurologic, Musculoskeletal, Skin, Hematologic/Lymphatic and Allergic/Immunologic.     Medical History:    I have reviewed the patient's updated past medical history, surgical history, family history, social history, as well as current medications and allergies as of 4/23/2024. Changes to these items have been updated and marked as reviewed in the electronic medical record.    Physical Exam:    Vitals:  height is 1.829 m (6') and weight is " 91.1 kg (200 lb 14.4 oz).   General: Patient doing well overall and is in no apparent distress.  Psych: Pleasant affect, and answers questions appropriately.  Head & Face: Symmetric facial movements  Eyes: Pupils equal, round, reactive.  Extraocular movements intact without gaze restrictions or nystagmus. No epiphora.  Ears:  External auditory canals are normal.  Tympanic membranes are clear.  No middle ear effusion is seen.  All middle ear landmarks are normal.  Nose: Anterior rhinoscopy revealed normal sinonasal mucosa. More posterior areas of the nasal cavity could not be completely examined.  Oral Cavity/Oropharynx:  Without lesions or masses to visual exam.  Neck: Supple without lymphadenopathy.  Lungs: Non-labored, and without evidence of stridor.  Cardiac: Pulses are strong, well-perfused.  Extremities: Without gross evidence of clubbing, cyanosis, or edema.  Neuro: Cranial nerves II-XII grossly intact; Intact facial movements.      Imaging:  I reviewed his CT sinus from 1/4/2024 which shows left sphenoid sinus opacification with variable hyperdensity, right lamina papyracea defect with medialization of the right medial orbital contents, and right maxillary sinus floor artifact originating from a high-riding dental post    Procedure:  Rigid nasal endoscopy (16405)  Pre-procedure diagnosis/Indication for procedure:  To evaluate areas not visualized on anterior rhinoscopy   Anesthesia:  None  Description:  A 0-degree 3-mm rigid nasal endoscope was used to examine the left and right nasal cavities.  The nasal valve areas were examined for abnormalities or collapse.  The inferior and middle turbinates were evaluated.  The middle and superior meatuses, and the sphenoethmoid recesses were examined and inspected for mucopurulence and polyps. Once the endoscope was withdrawn, the patient was noted to have tolerated the procedure well without complications and was returned to ambulatory status.    Findings:  Leftward  septal deviation  Inferior turbinate hypertrophy, bilateral  No purulence noted in the left sphenoethmoidal recess      Assessment:     Gary Contreras is a 68 y.o. male with symptoms of bilateral sinus pain/pressure and orbital pain. We reviewed his CT scan at length, including a review of his right maxillary sinus with associated dental hardware extending into the sinus lumen, though the mucosa surrounding this area appears healthy on the CT scan. We reviewed the left sphenoid sinus findings, regarding which I am concerned for a possible allergic fungal component given the variable hyperintensity seen on CT. We discussed that this is typically managed with surgery and that it may or may not be related to his underlying symptoms. We will plan for a trial of mometasone rinses followed by repeat imaging with consideration of surgery thereafter. We discussed that there is no current indication for antibiotic therapy given his endoscopy findings.     Plan:    -Mometasone irrigations BID  -Repeat CT sinus w/ volumetric data following medical management  -Follow-up after CT sinus to discuss next steps.     Ryan Adams MD, M.Eng.   of Otolaryngology - Head & Neck Surgery  Division of Rhinology and Endoscopic Skull Base Surgery  [facility]

## 2024-04-24 ENCOUNTER — TELEPHONE (OUTPATIENT)
Dept: CRITICAL CARE MEDICINE | Facility: HOSPITAL | Age: 69
End: 2024-04-24
Payer: MEDICARE

## 2024-04-24 NOTE — TELEPHONE ENCOUNTER
Called patient to update on labs and CT.   Resolution of bronchiolitis seen. Mild allergies on labs and patient following with ENT for sinus inflammation.   Patient to follow up as previously planned. Repeat CT in 12 months

## 2024-04-25 ENCOUNTER — TELEPHONE (OUTPATIENT)
Dept: PRIMARY CARE | Facility: CLINIC | Age: 69
End: 2024-04-25
Payer: MEDICARE

## 2024-04-25 NOTE — TELEPHONE ENCOUNTER
----- Message from Tutu Fay DO sent at 4/25/2024  1:29 PM EDT -----  Scan of his lungs are improved congestion he had in the past has resolved nodules are stable recheck in 1 year

## 2024-04-26 DIAGNOSIS — J32.8 OTHER CHRONIC SINUSITIS: ICD-10-CM

## 2024-04-29 RX ORDER — MOMETASONE FUROATE 100 %
POWDER (GRAM) MISCELLANEOUS
Qty: 180 G | Refills: 0 | Status: SHIPPED | OUTPATIENT
Start: 2024-04-29

## 2024-05-01 ENCOUNTER — APPOINTMENT (OUTPATIENT)
Dept: OTOLARYNGOLOGY | Facility: CLINIC | Age: 69
End: 2024-05-01
Payer: MEDICARE

## 2024-05-09 ENCOUNTER — HOSPITAL ENCOUNTER (OUTPATIENT)
Dept: RADIOLOGY | Facility: CLINIC | Age: 69
Discharge: HOME | End: 2024-05-09
Payer: MEDICARE

## 2024-05-09 DIAGNOSIS — J32.3 CHRONIC SPHENOIDAL SINUSITIS: ICD-10-CM

## 2024-05-09 PROCEDURE — 70486 CT MAXILLOFACIAL W/O DYE: CPT

## 2024-05-09 PROCEDURE — 70486 CT MAXILLOFACIAL W/O DYE: CPT | Performed by: RADIOLOGY

## 2024-05-14 ENCOUNTER — OFFICE VISIT (OUTPATIENT)
Dept: OTOLARYNGOLOGY | Facility: CLINIC | Age: 69
End: 2024-05-14
Payer: MEDICARE

## 2024-05-14 VITALS — BODY MASS INDEX: 26.95 KG/M2 | WEIGHT: 199 LBS | HEIGHT: 72 IN

## 2024-05-14 DIAGNOSIS — J32.9 SINUSITIS, UNSPECIFIED CHRONICITY, UNSPECIFIED LOCATION: ICD-10-CM

## 2024-05-14 DIAGNOSIS — J32.3 CHRONIC SPHENOIDAL SINUSITIS: Primary | ICD-10-CM

## 2024-05-14 PROCEDURE — 1036F TOBACCO NON-USER: CPT | Performed by: OTOLARYNGOLOGY

## 2024-05-14 PROCEDURE — 99213 OFFICE O/P EST LOW 20 MIN: CPT | Performed by: OTOLARYNGOLOGY

## 2024-05-14 PROCEDURE — 1160F RVW MEDS BY RX/DR IN RCRD: CPT | Performed by: OTOLARYNGOLOGY

## 2024-05-14 PROCEDURE — 3008F BODY MASS INDEX DOCD: CPT | Performed by: OTOLARYNGOLOGY

## 2024-05-14 PROCEDURE — 1159F MED LIST DOCD IN RCRD: CPT | Performed by: OTOLARYNGOLOGY

## 2024-05-14 NOTE — PROGRESS NOTES
Chief Complaint:  Sinusitis    Referring Provider: No ref. provider found    History of Present Illness:    Gary Contreras is a 69 y.o. male, presenting for CT review follow-up for chronic sinusitis.  He has been using his mometasone irrigations as directed.  He underwent CT of the sinuses to follow-up and see the response to treatment.  I went over his CT in depth with him today.  It does show persistent left-sided chronic sphenoid sinusitis with hyperdensities consistent with either retained foreign body versus fungal debris.  Still continues to have facial pressure and discomfort.    ?  Review of Systems:    Review of symptoms was negative except for those stated including Cardiopulmonary, Genitourinary, Gastrointestinal, Psychological, Sleep pattern, Endocrine, Eyes, Neurologic, Musculoskeletal, Skin, Hematologic/Lymphatic and Allergic/Immunologic.     Medical History:    I have reviewed the patient's updated past medical history, surgical history, family history, social history, as well as current medications and allergies as of 5/14/2024. Changes to these items have been updated and marked as reviewed in the electronic medical record.    Physical Exam:    Vitals:  height is 1.829 m (6') and weight is 90.3 kg (199 lb).   General: Patient doing well overall and is in no apparent distress.  Psych: Pleasant affect, and answers questions appropriately.  Head & Face: Symmetric facial movements  Eyes: Pupils equal, round, reactive.  Extraocular movements intact without gaze restrictions or nystagmus. No epiphora.  Ears:  External auditory canals are normal.  Tympanic membranes are clear.  No middle ear effusion is seen.  All middle ear landmarks are normal.  Nose: Anterior rhinoscopy revealed normal sinonasal mucosa. More posterior areas of the nasal cavity could not be completely examined.  Oral Cavity/Oropharynx:  Without lesions or masses to visual exam.  Neck: Supple without lymphadenopathy.  Lungs: Non-labored,  and without evidence of stridor.  Cardiac: Pulses are strong, well-perfused.  Extremities: Without gross evidence of clubbing, cyanosis, or edema.  Neuro: Cranial nerves II-XII grossly intact; Intact facial movements.    Assessment:     Gary Contreras is a 69 y.o. male with chronic sphenoid sinusitis nonresponsive to medications    Plan:      I went over the risks benefits alternatives to endoscopic sinus surgery and septoplasty with Gary.  I do recommend opening the sphenoid sinus on the left side and he has not responded to topical therapies or oral antibiotics.  He continues to be symptomatic.  I did explain to him that I was uncertain if it would improve his tinnitus or all of his facial pain symptoms however I did think that it would at least help with some given the proximity of the sphenoid sinus to V2 and the vidian nerve.  He is in agreement with moving forward with surgery and we will attempt to identify family member who can accompany him to the surgery center on that day.    Ryan Adams MD, M.Eng.   of Otolaryngology - Head & Neck Surgery  Division of Rhinology and Endoscopic Skull Base Surgery  [facility]

## 2024-05-20 ENCOUNTER — HOSPITAL ENCOUNTER (OUTPATIENT)
Dept: RESPIRATORY THERAPY | Facility: HOSPITAL | Age: 69
Discharge: HOME | End: 2024-05-20
Payer: MEDICARE

## 2024-05-20 DIAGNOSIS — R06.09 DYSPNEA ON EXERTION: ICD-10-CM

## 2024-05-20 PROCEDURE — 94060 EVALUATION OF WHEEZING: CPT | Performed by: INTERNAL MEDICINE

## 2024-05-20 PROCEDURE — 94729 DIFFUSING CAPACITY: CPT | Performed by: INTERNAL MEDICINE

## 2024-05-20 PROCEDURE — 94726 PLETHYSMOGRAPHY LUNG VOLUMES: CPT

## 2024-05-20 PROCEDURE — 94726 PLETHYSMOGRAPHY LUNG VOLUMES: CPT | Performed by: INTERNAL MEDICINE

## 2024-05-20 PROCEDURE — 94618 PULMONARY STRESS TESTING: CPT | Performed by: INTERNAL MEDICINE

## 2024-05-21 DIAGNOSIS — J32.3 CHRONIC SPHENOIDAL SINUSITIS: ICD-10-CM

## 2024-05-21 DIAGNOSIS — J34.2 DEVIATED NASAL SEPTUM: ICD-10-CM

## 2024-05-28 LAB
MGC ASCENT PFT - FEV1 - POST: 2.58
MGC ASCENT PFT - FEV1 - POST: 2.58
MGC ASCENT PFT - FEV1 - PRE: 2.18
MGC ASCENT PFT - FEV1 - PRE: 2.18
MGC ASCENT PFT - FEV1 - PREDICTED: 3.38
MGC ASCENT PFT - FEV1 - PREDICTED: 3.38
MGC ASCENT PFT - FVC - POST: 4.95
MGC ASCENT PFT - FVC - POST: 4.95
MGC ASCENT PFT - FVC - PRE: 4.45
MGC ASCENT PFT - FVC - PRE: 4.45
MGC ASCENT PFT - FVC - PREDICTED: 4.47
MGC ASCENT PFT - FVC - PREDICTED: 4.47

## 2024-06-07 DIAGNOSIS — K21.9 GASTRO-ESOPHAGEAL REFLUX DISEASE WITHOUT ESOPHAGITIS: ICD-10-CM

## 2024-06-07 RX ORDER — PANTOPRAZOLE SODIUM 40 MG/1
TABLET, DELAYED RELEASE ORAL
Qty: 90 TABLET | Refills: 0 | Status: SHIPPED | OUTPATIENT
Start: 2024-06-07

## 2024-06-15 ASSESSMENT — ENCOUNTER SYMPTOMS
SORE THROAT: 1
SHORTNESS OF BREATH: 1
CHILLS: 0
FEVER: 0
SINUS PAIN: 1
NAUSEA: 0
APPETITE CHANGE: 0
EYE PAIN: 1
PALPITATIONS: 0
ADENOPATHY: 0
WHEEZING: 1
RHINORRHEA: 1
SINUS PRESSURE: 1
UNEXPECTED WEIGHT CHANGE: 0
DIAPHORESIS: 0

## 2024-06-15 NOTE — PROGRESS NOTES
"past week with frothy sputum production, subjective fevers, and shortness of breath. She has concerns regarding aspiration prevention and his ongoing chronic constipation that is not relieved with miralax, senna, colace, nor dulcolax.    Mr. Romero reports no acute complaints.     He is seen again later in the morning when brought up to [facility] on rounds. Again, he denies acute complaints. His wife is on the phone via his daughter's cell phone. They repeat questions regarding aspiration risk, progression of dementia, and constipation treatments.    They had all their questions answered.    Objective   Current Vitals  /61   Pulse 66   Temp 37.4 °C (99.3 °F) (Temporal)   Resp 19   Ht 1.727 m (5' 8\")   Wt 59 kg (130 lb)   SpO2 100%   BMI 19.77 kg/m²      No intake/output data recorded.    Physical Exam  Constitutional:       Appearance: He is ill-appearing (Chronically).      Comments: Poorly groomed.   HENT:      Head: Normocephalic and atraumatic.      Comments: Small, 1.5-2cm round lesion on superior surface of patients scalp underlying hair. Non-mobile, non-tender, non-erythematous, firm, without fluctuance.     Nose: Nose normal. No congestion or rhinorrhea.      Mouth/Throat:      Mouth: Mucous membranes are moist.      Pharynx: Oropharynx is clear. No oropharyngeal exudate.   Eyes:      General: No scleral icterus.     Extraocular Movements: Extraocular movements intact.      Conjunctiva/sclera: Conjunctivae normal.      Pupils: Pupils are equal, round, and reactive to light.   Cardiovascular:      Rate and Rhythm: Normal rate and regular rhythm.      Pulses: Normal pulses.      Heart sounds: Normal heart sounds. No murmur heard.     No gallop. " Subjective:      Patient ID: Brandy Bruce is a 66 y.o. male. Chief Complaint   Patient presents with    URI     x 1 week. Symptoms include sinus pressure, stiff neck, fatigue, cough, congestion. Has tried Aleve with no relief. HPI    Here today congestion cough and drainage last week eating drinking well has a lot of sinus pressure pain across his head eating drinking okay still smoking against medical advice despite recommendations from to quit    Tylenol Sudafed no help his facial pain and left side noted problems in quite some time    Taken his acid pill also      No Known Allergies  Outpatient Encounter Prescriptions as of 8/6/2018   Medication Sig Dispense Refill    pantoprazole (PROTONIX) 40 MG tablet TAKE 1 TABLET BY MOUTH EVERY DAY 90 tablet 3     No facility-administered encounter medications on file as of 8/6/2018. Social History     Social History    Marital status: Single     Spouse name: N/A    Number of children: N/A    Years of education: N/A     Occupational History    Not on file. Social History Main Topics    Smoking status: Current Every Day Smoker     Packs/day: 1.50     Years: 40.00    Smokeless tobacco: Never Used    Alcohol use 14.4 oz/week     24 Cans of beer per week    Drug use: No    Sexual activity: Not Currently     Other Topics Concern    Not on file     Social History Narrative    No narrative on file     Family History   Problem Relation Age of Onset    Heart Attack Father     Diabetes Brother      Past Medical History:   Diagnosis Date    COPD (chronic obstructive pulmonary disease) ([facility] [address]     Reactive airway disease     Sleep apnea     non-compliant     History reviewed. No pertinent surgical history. REVIEW OF SYSTEMS:   Patient seen today for exam.  Denies any problems with hearing, headaches or vision. Denies any shortness of breath, chest pain, nausea or vomiting. No black stool, no blood in the stool. No heartburn.   Denies any   Pulmonary:      Effort: Pulmonary effort is normal.      Breath sounds: No wheezing, rhonchi or rales.   Abdominal:      General: Abdomen is flat. Bowel sounds are normal. There is no distension.      Tenderness: There is no abdominal tenderness.   Skin:     General: Skin is warm and dry.      Capillary Refill: Capillary refill takes less than 2 seconds.   Neurological:      Mental Status: He is alert.      Motor: No weakness.      Comments: Slight right sided facial droop, chronic       Psychiatric:         Mood and Affect: Mood normal.         Behavior: Behavior normal.      Comments: Alert  Oriented to person (self) and place (hospital) but not city/state/date/year.     Relevant Results  Labs:  CBC: WBC 9.4 , HGB 8.9,   BMP: , K 5.1, Cl 98, HCO3 33, BUN 37, CR 2.10, Glu 123  LFTS: AST 15 , ALT 7, ALKPHOS 44 , TBILI 0.4 , DBILI 0.1  TROP: 12  BNP: 133  COAGS: PT 17.2 , PTT >200  , INR 1.5  UA:   Results from last 7 days   Lab Units 06/15/24  0011   COLOR U  Light-Yellow   PH U  7.5   SPEC GRAV UR  1.019   PROTEIN U mg/dL 20 (TRACE)   BLOOD UR  NEGATIVE   NITRITE U  NEGATIVE   WBC UR /HPF NONE     ABG:    CALCIUM 9.3 MAG No results found for requested labs within last 365 days. ALB 3.0 LACTATE 1.2 PHOS No results found for requested labs within last 365 days. COVIDNo results found for requested labs within last 365 days.    Micro/culture data:  No results found for the last 120 days.    Imaging:  CT abdomen pelvis w IV contrast  Narrative: Interpreted By:  Rivera Leyva and Dervishi Mario   STUDY:  CT ABDOMEN PELVIS W IV CONTRAST;  6/15/2024 12:28 am      INDICATION:  Signs/Symptoms:lower abdominal pain, constipation.      COMPARISON:  CT abdomen pelvis 08/24/2018      ACCESSION NUMBER(S):  [id number]      ORDERING CLINICIAN:  SAM STEEL      TECHNIQUE:  CT of the abdomen and pelvis was performed.  Standard contiguous  axial images were obtained at 3 mm slice thickness through the  abdomen and  problems with constipation or diarrhea either. No dysuria type symptoms. Objective:     /80 (Site: Left Arm, Position: Sitting, Cuff Size: Medium Adult)   Pulse 89   Temp 96.7 °F (35.9 °C) (Temporal)   Resp 12   Ht 6' (1.829 m)   Wt 177 lb 3 oz (80.4 kg)   SpO2 95%   BMI 24.03 kg/m²     Physical Exam        PHYSICAL EXAMINATION:  Vital signs as noted. Alert, oriented in no acute distress. HEENT:  TMs are showing fluid bilaterally. Pharynx reveals postnasal drainage noted. Positive pain over sinuses and forehead  Pos shoddy adenopathy noted bilaterallyNECK: supple. HEART:  RRR without gallops. LUNGS:  clear to auscultation, no wheezing or rhonchi. ABDOMEN:  soft and nontender, bowel sounds positive. EXTREMITIES:  no edema. SKIN: without any changes      Assessment:       Diagnosis Orders   1. Acute bacterial sinusitis     2. Gastroesophageal reflux disease without esophagitis               Plan:        Orders Placed This Encounter   Medications    cefdinir (OMNICEF) 300 MG capsule     Sig: Take 2 capsules by mouth daily for 10 days     Dispense:  20 capsule     Refill:  0     No orders of the defined types were placed in this encounter. Health Maintenance Due   Topic Date Due    Hepatitis C screen  1955    HIV screen  05/11/1972    DTaP/Tdap/Td vaccine (1 - Tdap) 05/11/1976    Pneumococcal med risk (1 of 1 - PPSV23) 05/11/1976    Shingles Vaccine (1 of 2 - 2 Dose Series) 05/11/2007    Low dose CT lung screening  12/21/2016             Controlled Substances Monitoring:     No flowsheet data found.     We'll see a dentist if things worsen or change especially with his teeth issues    If anything worsens or changes please call us at once , follow-up in the office as planned, pelvis. Coronal and sagittal reconstructions at 3 mm  slice thickness were performed.      80 ml of contrast PICC 350 were administered intravenously without  immediate complication.      FINDINGS:  LOWER CHEST:  Please see separately dictated same-day chest CT.      ABDOMEN:      LIVER:  No significant abnormality.      BILE DUCTS:  Normal caliber.      GALLBLADDER:  Cholelithiasis without CT evidence of cholecystitis.      PANCREAS:  No significant abnormality.      SPLEEN:  No significant abnormality.      ADRENAL GLANDS:  No significant abnormality.      KIDNEYS AND URETERS:  Innumerable bilateral cystic renal lesions which are incompletely  characterize and similar compared to prior imaging felt to represent  renal cysts. No hydroureteronephrosis.      PELVIS:      BLADDER:  Diffuse bladder wall thickening in the setting of underdistention,  correlate for evidence of cystitis.      REPRODUCTIVE ORGANS:  No pelvic masses.      BOWEL:  The stomach is underdistended but grossly unremarkable. No evidence  of bowel obstruction. No appreciable bowel inflammation. Mild fluid  within the ascending colon. The appendix is unremarkable. Mild wall  thickening at the hepatic flexure. Extensive colonic diverticulosis  without definite evidence of acute diverticulitis. The rectum is  dilated with a large amount of stool.      VESSELS:  Severe atherosclerotic calcification of the infrarenal abdominal  aorta. No acute vascular abnormality on nondedicated exam.      PERITONEUM/RETROPERITONEUM/LYMPH NODES:  No ascites or free air, no fluid collection.  No abdominopelvic  lymphadenopathy is present.      BONES AND ABDOMINAL WALL:  No suspicious osseous lesions are identified. Degenerative discogenic  disease is noted in the lower thoracic and lumbar spine.  The  abdominal wall soft tissues appear normal.      Impression: 1. Mild colonic wall thickening at the hepatic flexure. Findings  could represent underdistention with a focal  stercoral colitis.  4. Mild circumferential bladder wall thickening in the setting of  underdistention, correlate for evidence of cystitis.  5. Additional chronic findings are as detailed above.      I personally reviewed the images/study and I agree with the findings  as stated by Resident Miguel Martines MD.      MACRO:  None      Signed by: Rivera Leyva 6/15/2024 1:40 AM  Dictation workstation:   [id number]  CT angio chest for pulmonary embolism  Narrative: Interpreted By:  Rivera Leyva,  and Conrad Rivera   STUDY:  CT ANGIO CHEST FOR PULMONARY EMBOLISM;  6/15/2024 12:28 am      INDICATION:  Signs/Symptoms:multiple myeloma, chest pain, tachycardia, SOB.      COMPARISON:  CT chest: 05/02/2024      ACCESSION NUMBER(S):  [id number]      ORDERING CLINICIAN:  SAM STEEL      TECHNIQUE:  Helical data acquisition of the chest was obtained after intravenous  administration of 80 ML Omnipaque 350, as per PE protocol. Images  were reformatted in coronal and sagittal planes. Axial and coronal  maximum intensity projection (MIP) images were created and reviewed.      FINDINGS:  POTENTIAL LIMITATIONS OF THE STUDY: Mixing artifact and respiratory  motion within the distal segmental and subsegmental arteries.      HEART AND VESSELS:  No discrete filling defects within the main pulmonary artery or its  branches to mid segmental level. Please note that, assessment of  distal segmental and subsegmental branches is limited and small  peripheral emboli are not entirely excluded. Main pulmonary artery is  mildly dilatedat 3.2 cm.      The thoracic aorta normal in course and caliber.Mild atherosclerotic  calcification of the aorta.No acute aortic pathology within  limitations  of non dedicated imaging Mild coronary artery  calcifications are seen. Please note,the study is not optimized for  evaluation of coronary arteries.      The mild cardiomegaly.There are no findings to suggest right heart  strain. There is a right subclavian approach dual chamber cardiac  pacemaker/ICD seen in placewith leads terminating within right atrium  and apical right ventricle. There is no pericardial effusion seen.      MEDIASTINUM AND SETH, LOWER NECK AND AXILLA:  The visualized thyroid gland is within normal limits.  Mildly prominent lymph nodes are noted throughout the mediastinum  including a 0.8 cm subcarinal and left paratracheal node. Esophagus  appears within normal limits as seen.      LUNGS AND AIRWAYS:  Dependent debris within the distal trachea.      Lower lung predominant subpleural reticular opacities with  bronchiectasis and bronchiolectasis as well as cystic changes are  again noted likely reflecting background of interstitial/fibrotic  lung disease. There are areas of slightly increased septal thickening  and confluence throughout the mid to lower lung zones as well as  scattered tree-in-bud opacities and numerous nodules on the order of  4 mm throughout the mid to upper lung zones. No pleural effusion or  pneumothorax.      UPPER ABDOMEN:  Cholelithiasis without discrete CT evidence of cholecystitis.  Otherwise no acute abnormality of the visualized upper abdomen.  Additional findings are as dictated on separate imaging.              CHEST WALL AND OSSEOUS STRUCTURES:  No acute abnormality of the chest wall. Prior sternotomy with intact  sternal wires. No acute osseous pathology.There are no suspicious  osseous lesions.Mild multilevel degenerative changes within  visualized spine.      Impression: 1. No evidence of acute pulmonary embolism to segmental level. Please  note that, assessment of subsegmental branches is limited and small  peripheral emboli are not entirely excluded.  2.  opacities  throughout the mid to upper lung zones. These findings are concerning  for a superimposed infectious or inflammatory process with a  component of edema or infiltrative neoplastic process not excluded.  4. Mild enlargement of the main pulmonary artery which may be seen in  the setting of pulmonary arterial hypertension.  5. Debris within the distal trachea suggesting aspiration.  6. Additional findings as detailed above.          I personally reviewed the images/study and I agree with the findings  as stated by Resident Miguel Martines MD.      MACRO:  None      Signed by: Rivera Leyva 6/15/2024 1:35 AM  Dictation workstation:   [id number]  ECG 12 lead  Sinus tachycardia with 1st degree AV block with occasional Premature ventricular complexes  Left axis deviation  Septal infarct (cited on or before 05-MAY-2024)  Possible Lateral infarct (cited on or before 06-MAY-2024)  Abnormal ECG  When compared with ECG of 14-JUN-2024 17:39,  Premature ventricular complexes are now Present  Aberrant conduction is no longer Present  QRS axis Shifted right  See ED provider note for full interpretation and clinical correlation  Confirmed by Prosper Baxter ([id number]) on 6/15/2024 12:07:03 AM    MEDS:  Scheduled medications  allopurinol, 50 mg, oral, Daily  apixaban, 2.5 mg, oral, q12h  aspirin, 81 mg, oral, Daily  atorvastatin, 20 mg, oral, Daily  azithromycin, 500 mg, intravenous, q24h  bisacodyl, 10 mg, oral, Daily  carvedilol, 6.25 mg, oral, q12h  cefTRIAXone, 2 g, intravenous, q24h  cholecalciferol, 2,000 Units, oral, Daily  finasteride, 5 mg, oral, Daily  mexiletine, 150 mg, oral, q8h BLANCO  mirtazapine, 15 mg, oral, Nightly  polyethylene glycol, 17 g, oral,  Daily  sennosides-docusate sodium, 2 tablet, oral, BID  tamsulosin, 0.8 mg, oral, Daily    PRN medications: acetaminophen, albuterol, fluticasone, furosemide, hydrOXYzine HCL, sodium chloride 0.9%, vancomycin     Assessment/Plan   Assessment/Plan:   87 y.o. 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  Patient's presentation is concerning given this is his 3rd presentation in the past month with overall picture concerning for failure to thrive.       Updates 6/15:  - Tap water enema today   - SLP/PT/OT consults   - Wound care consult for stage I decubitus ulcer  - Plan for palliative consult Monday am    #Aspiration Pneumonia  ::cxray/ct noting patchy bilateral opacifications  ::per nursing bedside swallow patient okay for soft foods  Plan:  -consider formal SLP eval  -pt. S/p cefepime/azithro in the ED  -cont. Ceftriaxone/azithro  -sputum culture pending  -procal pending     #Constipation  #C/f Colitis on CT  ::per daughter patient endorsing abdominal pain the past few days  ::wife attempted multiple type of stool support without success  ::CT noting significant stool burden  Plan:  -aggressive bowel regimen (daily miralax, senna, colace, dulcolax) -- goal of daily bowel movements.  -placed tap water enema (can do PRN for constipation >24hr)  -cont. Flagyl     #Failure to Thrive  #Dementia  ::3rd admission in the past month  ::reported poor appetite and minimal PO intake  ::pt. Frail and weak  Plan:  -palliative previously on board and family receptive to their support  -consider palliative consult for further recommendations  -consider nutrition  daily  -cont. Carvedilol 6.25mg BID  -cont. Aspirin 81mg and atorvastatin 20mg    Fluids: Replete PRN  Electrolytes: Keep mg >2, phos >3  and K >4  Nutrition:  Adult diet Regular; Easy to chew   Antimicrobials: Azithro/Flagyl/Ceftriaxone  DVT: Apixaban for afib  Bowel care:Bisacodyl, Docusate, Miralax, and Senna  Catheter:None  Lines: 20G PIVx2  Oxygen:Nasal Cannula  Disposition: Floor for 48-72hrs while swallow eval, palliative consult, and improvement of aspiration pneumonia  Code Status: DNR and No Intubation (confirmed on admission)   NOK:  Primary Emergency Contact: Leticia Romero, Home Phone: [phone]    Frederick Kenney DO  Internal Medicine-Genetics, PGY-1

## 2024-06-15 NOTE — PROGRESS NOTES
"Subjective   Patient ID: Gary Contreras is a 69 y.o. male who presents for Shortness of Breath (Follow up ).  HPI  Mr. Contreras is a 68M PMH allergies, sinusitis, HLD, hearing loss, GERD, who is referred to pulmonary for COPD and lung nodule.     Last visit 4/5/24: follow up CT for small nodule  - PFT, 6MWT, TTE  - CBC, TSH, resp allergy panel for SOB and cough  - CT chest 4/22/24  - start albuterol, flonase, claritin, restart PPI    #Dyspnea  Tried symbicort, breztri, trelegy, anoro ellipta, albuterol in the past. Discussed with his PCP and patient is anxious about diagnoses but also does not adhere to therapy very long.  Patient reports chronic sinus congestion, and GERD. He saw ENT once and they focused on tinnitus.   Dust and alternaria on allergy panel, CBC without eosinophils  Moderate obstruction on PFT.   Patient did not complete TTE.   ENT planning for sinus surgery in 7/2024  He reports feeling lymph nodes in his neck. He has good range of motion but feels \"crackling\" when moving his head.   He feels he has to make an effort to take a breath.   He has run out of the flonase.   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 Negative for activity change, appetite change, chills, diaphoresis, fever and unexpected weight change.   HENT:  Positive for postnasal drip, rhinorrhea, sinus pressure, sinus pain, sneezing, sore throat, tinnitus, trouble swallowing and voice change. Negative for congestion and hearing loss.         Describes more neck tightness than trouble swallowing  Voice cracks per patient,   Eyes:  Positive for pain.   Respiratory:  Positive for shortness of breath and wheezing. Negative for cough and chest tightness.       "   Wheezing in AM   Cardiovascular:  Negative for chest pain, palpitations and leg swelling.   Gastrointestinal:  Negative for nausea.   Skin:  Negative for rash.        Subjective redness to chest   Allergic/Immunologic: Positive for environmental allergies.   Hematological:  Negative for adenopathy.       Objective   Physical Exam  Vitals reviewed.   Constitutional:       Appearance: Normal appearance.   HENT:      Head: Normocephalic and atraumatic.      Nose: No rhinorrhea.      Mouth/Throat:      Mouth: Mucous membranes are moist.      Pharynx: No posterior oropharyngeal erythema.   Eyes:      Extraocular Movements: Extraocular movements intact.   Neck:      Comments: Some generalized fullness but no discretely enlarged lymph nodes  Cardiovascular:      Rate and Rhythm: Normal rate and regular rhythm.      Heart sounds: No murmur heard.  Pulmonary:      Effort: Pulmonary effort is normal.      Breath sounds: No wheezing or rhonchi.   Abdominal:      Palpations: Abdomen is soft.   Musculoskeletal:      Cervical back: Neck supple. No tenderness.      Right lower leg: No edema.      Left lower leg: No edema.   Lymphadenopathy:      Cervical: No cervical adenopathy.   Neurological:      General: No focal deficit present.      Mental Status: He is alert and oriented to person, place, and time.   Psychiatric:      Comments: anxious       TTE not done    PFT 5/20/24  FVC 4.45 99%, FEV1 2.18 64%, no BD response  TLC 8.94 120%, gas trapping present  DLCO 22.14 79%  Moderate obstruction, + BD response, hyperinflation and air trapping present, DLCO normal    CT chest 4/22/24  IMPRESSION:  1. Resolution of previously new scattered areas of mild bronchiolitis  in the right lung. Underlying smoking-related lung disease and stable  small nodular densities which are likely benign. Advise attention on  ongoing annual low-dose lung cancer screening CT.    CT chest 1/10/24  FINDINGS:  LUNGS AND AIRWAYS:  The trachea and central  airways are patent. No endobronchial lesion  is seen.  There is mild bilateral upper lung predominant centrilobular and  paraseptal emphysema.There is no focal consolidation, pleural  effusion, or pneumothorax.  Multiple areas of ground-glass opacity and tree-in-bud nodularity in  the right upper and right middle lobe, new compared to prior study.  3 mm right lower lobe nodule, image 134/379, stable.  4 mm right lower lobe nodule, image 240/379, stable.  5 mm right lower lobe nodule, image 269/379, stable.  MEDIASTINUM AND GENI, LOWER NECK AND AXILLA:  The visualized thyroid gland is within normal limits.  No evidence of thoracic lymphadenopathy by CT criteria.  Esophagus appears within normal limits as seen.  HEART AND VESSELS:  Ectatic ascending thoracic aorta measuring 3.9 cm.There is mild  scattered calcified atherosclerosis present. Main pulmonary artery  and its branches are normal in caliber. Mild coronary artery  calcifications are seen. Please note,the study is not optimized for  evaluation of coronary arteries. The cardiac chambers are not  enlarged. There is no pericardial effusion seen.  UPPER ABDOMEN:  1.5 cm hypodense lesion in the right kidney, likely cyst  CHEST WALL AND OSSEOUS STRUCTURES:  Chest wall is within normal limits.  No acute osseous pathology.There are no suspicious osseous lesions.      IMPRESSION:  1. Multiple areas of ground-glass opacity and tree-in-bud nodularity  predominantly in the right upper and right middle lobe, likely  inflammatory/infectious in etiology. Recommend short-term follow-up  chest CT in 3 months to confirm resolution and establish a new  baseline.  2. Scattered predominantly right-sided pulmonary nodules measuring to 5 mm, stable and as described above.  3. Mild upper lung predominant emphysema.  4. Mild coronary artery calcification.  5. Other findings as described above    CT sinus 1/10/2024  IMPRESSION:  *Polypoid mucosal thickening left sphenoid sinus with  mineralized  inspissated mucus secretions *Previous fracture right medial orbital  wall without muscle entrapment *Likely postoperative bony hypertrophy  in the right alveolar recess secondary to dental implant versus  osteoma    Assessment/Plan   Diagnoses and all orders for this visit:  Chronic obstructive pulmonary disease, unspecified COPD type (Multi)        Mr. Contreras is a 68M PMH allergies, sinusitis, HLD, hearing loss, GERD, who is referred to pulmonary for COPD and lung nodule.     #Lung nodule  #Former tobacco user  Repeat CT chest 4/2025 unless further symptoms require addressing prior    #COPD  #Dyspnea on exertion  #Cough  Patient with moderate COPD on PFTs with possible bronchodilator response (was borderline), and hyperinflation. Plan to try stiolto sample and continue with LABA/LAMA if notices improvement. Patient to call if thinks it is helping. We set up the sample and showed him how to use it in office today.   Patient to continue albuterol PRN  Sinus inflammation persists and patient advised to use mometasone intranasally if out of the flonase in preparation for his procedure with ENT.   Ok to hold on TTE unless patient developing dyspnea on exertion again    RTC 3-4 months     Guerita Rodrigues MD 06/20/24 10:01 AM

## 2024-06-20 ENCOUNTER — APPOINTMENT (OUTPATIENT)
Dept: PULMONOLOGY | Facility: CLINIC | Age: 69
End: 2024-06-20
Payer: MEDICARE

## 2024-06-20 VITALS
WEIGHT: 201 LBS | SYSTOLIC BLOOD PRESSURE: 125 MMHG | BODY MASS INDEX: 27.22 KG/M2 | HEIGHT: 72 IN | OXYGEN SATURATION: 94 % | HEART RATE: 66 BPM | DIASTOLIC BLOOD PRESSURE: 82 MMHG | TEMPERATURE: 98 F

## 2024-06-20 DIAGNOSIS — J44.9 CHRONIC OBSTRUCTIVE PULMONARY DISEASE, UNSPECIFIED COPD TYPE (MULTI): Primary | ICD-10-CM

## 2024-06-20 PROCEDURE — 1126F AMNT PAIN NOTED NONE PRSNT: CPT | Performed by: INTERNAL MEDICINE

## 2024-06-20 PROCEDURE — 1036F TOBACCO NON-USER: CPT | Performed by: INTERNAL MEDICINE

## 2024-06-20 PROCEDURE — 3008F BODY MASS INDEX DOCD: CPT | Performed by: INTERNAL MEDICINE

## 2024-06-20 PROCEDURE — 1159F MED LIST DOCD IN RCRD: CPT | Performed by: INTERNAL MEDICINE

## 2024-06-20 PROCEDURE — 1160F RVW MEDS BY RX/DR IN RCRD: CPT | Performed by: INTERNAL MEDICINE

## 2024-06-20 PROCEDURE — 99214 OFFICE O/P EST MOD 30 MIN: CPT | Performed by: INTERNAL MEDICINE

## 2024-06-20 RX ORDER — TIOTROPIUM BROMIDE AND OLODATEROL 3.124; 2.736 UG/1; UG/1
2 SPRAY, METERED RESPIRATORY (INHALATION) DAILY
COMMUNITY

## 2024-06-20 ASSESSMENT — PAIN SCALES - GENERAL: PAINLEVEL: 0-NO PAIN

## 2024-06-20 ASSESSMENT — ENCOUNTER SYMPTOMS
LOSS OF SENSATION IN FEET: 0
ACTIVITY CHANGE: 0
DEPRESSION: 0
COUGH: 0
VOICE CHANGE: 1
OCCASIONAL FEELINGS OF UNSTEADINESS: 0
CHEST TIGHTNESS: 0
TROUBLE SWALLOWING: 1
FATIGUE: 1

## 2024-06-20 ASSESSMENT — PATIENT HEALTH QUESTIONNAIRE - PHQ9
10. IF YOU CHECKED OFF ANY PROBLEMS, HOW DIFFICULT HAVE THESE PROBLEMS MADE IT FOR YOU TO DO YOUR WORK, TAKE CARE OF THINGS AT HOME, OR GET ALONG WITH OTHER PEOPLE: NOT DIFFICULT AT ALL
7. TROUBLE CONCENTRATING ON THINGS, SUCH AS READING THE NEWSPAPER OR WATCHING TELEVISION: NOT AT ALL
5. POOR APPETITE OR OVEREATING: NOT AT ALL
8. MOVING OR SPEAKING SO SLOWLY THAT OTHER PEOPLE COULD HAVE NOTICED. OR THE OPPOSITE, BEING SO FIGETY OR RESTLESS THAT YOU HAVE BEEN MOVING AROUND A LOT MORE THAN USUAL: NOT AT ALL
9. THOUGHTS THAT YOU WOULD BE BETTER OFF DEAD, OR OF HURTING YOURSELF: NOT AT ALL
2. FEELING DOWN, DEPRESSED OR HOPELESS: MORE THAN HALF THE DAYS
6. FEELING BAD ABOUT YOURSELF - OR THAT YOU ARE A FAILURE OR HAVE LET YOURSELF OR YOUR FAMILY DOWN: NOT AT ALL
SUM OF ALL RESPONSES TO PHQ9 QUESTIONS 1 AND 2: 5
SUM OF ALL RESPONSES TO PHQ QUESTIONS 1-9: 10
1. LITTLE INTEREST OR PLEASURE IN DOING THINGS: NEARLY EVERY DAY
4. FEELING TIRED OR HAVING LITTLE ENERGY: NEARLY EVERY DAY
3. TROUBLE FALLING OR STAYING ASLEEP OR SLEEPING TOO MUCH: MORE THAN HALF THE DAYS

## 2024-06-20 NOTE — PATIENT INSTRUCTIONS
Thank you for coming in today! Please call with questions or concerns.    COPD is a slowly progressive disease that has symptoms of cough, wheezing, shortness of breath.  We treat it with inhalers, avoiding infection, getting regularly recommended vaccines, quitting smoking, and exercising.  You have COPD based on your testing. I think a lot of your head and neck symptoms are from the sinuses and should bet better with surgery.     Please start:  - Stiolto - this is a once a day inhaler for COPD. IF you like it and it helps, call me and I will prescribe it. If it is too expensive and your insurance prefers a different one I will prescribe that    Return to clinic in 3-4 months    Call [phone] to schedule tests

## 2024-06-21 ASSESSMENT — ENCOUNTER SYMPTOMS
SINUS PRESSURE: 1
SHORTNESS OF BREATH: 1
APNEA: 1
SINUS PAIN: 1

## 2024-06-21 NOTE — PREPROCEDURE INSTRUCTIONS
Pre-Op Instructions & Checklist   Your surgery has been scheduled at [facility] at [address] [place], [address], [address], in the [facility]. Parking is to the left of the main entrance.  You will be contacted about the time of your surgery the day before your surgery (if your surgery is on a Monday you will be called the Friday before surgery). If you are unable to answer the phone, a detailed voicemail message will be left. Make sure that your voicemail box is not full so a message can be left. If you have not received a call by 3:00 pm you may call [phone] between the hours of 3:00 and 4:00 pm. Please be available by phone the night before/day of surgery in case there is a change in the schedule which may require you to arrive earlier/later.    14 DAYS BEFORE SURGERY STOP TAKING WEIGHT LOSS MEDICATIONS      7 DAYS BEFORE SURGERY STOP THESE MEDICATIONS:  Multiple Vitamins containing Vitamin E  Herbal supplements, Fish Oil, garlic pills, turmeric, CoQ enzyme  Stop taking aspirin, and aspirin-containing products as well as NSAID's such as Advil, Motrin, Aleve, Ibuprofen. Tylenol is okay to take for pain relief.   If you are currently taking Coumadin/Warfarin, we will have to coordinate that with your PCP &/or the Anticoagulation Clinic.    THE DAY BEFORE SURGERY:  Do not eat any food after midnight the night before surgery.   You are permitted to have clear liquids such as water, apple juice, plain tea or coffee (no milk or creamer), clear electrolyte-replenishing drinks such as Pedialyte, Gatorade, or Powerade (not yogurt or pulp-containing smoothies or juices such as orange juice) up to 2 hours before your surgery.    DAY OF SURGERY TAKE THESE MEDICATIONS (if it is not listed, do not take it.) If taking medications in a tablet/capsule form, take with a small sip of water.    Take: Pantoprazole; use your Stiolto Respimat inhaler before leaving the house, and bring your  albuterol inhaler with you to the surgery center.    ON THE MORNING OF SURGERY:  *Shower either the night before your surgery or the morning of your surgery  *Do not use moisturizers, creams, lotions or perfume, or make-up.  *Wear comfortable, loose fitting clothing.   *All jewelry and valuables should be left at home.  *Prosthetic devices such as contact lenses, hearing aids, dentures, eyelash extensions, hairpins and body piercings must be removed before surgery. Bring containers for eyeglasses/contacts, dentures, or hearing aids with you.    Diabetics: Please check fasting blood sugars upon waking up.  If fasting blood sugars are <80ml/dl, please drink 100ml/3oz. of apple juice no later than 2 hours prior to surgery.      BRING WITH YOU:   *Photo ID and insurance card  *Current list of medicines and allergies  *Pacemaker/Defibrillator/Heart stent cards  *Copy of your complete Advanced Directive/DHPOA-if applicable     SMOKING:  *Quitting smoking can make a huge difference to your health and recovery from surgery.    *If you need help with quitting, call 4-562-QUIT-NOW.  Alcohol:  *No alcoholic beverages for 48 hours before surgery.     AFTER OUTPATIENT SURGERY:  *A responsible adult MUST accompany you at the time of discharge and stay with you for 24 hours after your surgery.  *You may NOT drive yourself home after surgery.  *You may use a taxi or ride sharing service ([employer], Uber) to return home ONLY if you are to change the date accompanied by a friend or family member.  *Instructions for resuming your medications will be provided by your surgeon.     CONTACT SURGEON'S OFFICE IF YOU DEVELOP:  * Fever =/> 100.4 F   * New respiratory symptoms (e.g. cough, shortness of breath, respiratory distress, sore throat)  * Recent loss of taste or smell  *Flu like symptoms such as headache, fatigue or gastrointestinal symptoms  * If you develop any open sores, shingles, burning or painful urination   AND/OR:  * You no longer  wish to have the surgery.  * Any other personal circumstances change that may lead to the need to cancel or defer this surgery.  *You were admitted to any hospital within one week of your planned procedure.     If you have any questions regarding these preoperative instructions you may call [phone]. If you have questions regarding you surgical procedure, or post-operative care/recovery please call your surgeon's office.

## 2024-06-21 NOTE — CPM/PAT H&P
CPM/PAT Evaluation       Name: Gary Contreras (Gary Contreras)  /Age: 1955/69 y.o.   TELEMEDICINE ENCOUNTER  Patient was contacted by telephone for preadmission testing perioperative risk assessment prior to surgery.    CHIEF COMPLAINT  Chronic sphenoidal sinusitis    HPI  Anthony Contreras is a 69-year-old male complaining of sinus symptoms that began about 6 months ago.  Most bothersome is pain/pressure to forehead cheeks and eyes.  He reports nasal obstruction that does not change regardless of time of day or season.  He states he annually gets 1-2 sinus infections.  He has been using Flonase which has provided some symptomatic improvement.  He states he was seen last year by an oral surgeon and there was a complication with the placement of a dental implant.  He underwent a CT of the sinuses that showed persistent left-sided chronic sphenoidal sinusitis with hyperdensities consistent with either retained foreign body or fungal debris.  He denies any prior trauma to the nose/face, or prior sinus surgery.  He is scheduled for bilateral endoscopic sinus surgery with IGS on 07/15/2024.  Patient does complain of ongoing shortness of breath, and is followed by pulmonologist Dr.Cailey Rodrigues for his COPD.    ACTIVE PROBLEMS  Patient Active Problem List   Diagnosis    Allergies    COPD (chronic obstructive pulmonary disease) (Multi)    Dyslipidemia    Fatigue    GERD (gastroesophageal reflux disease)    Malaise and fatigue    Sinus pressure    BMI 25.0-25.9,adult    Aneurysm of ascending aorta without rupture (CMS-HCC)    Chronic sphenoidal sinusitis    Deviated nasal septum     PAST MEDICAL HISTORY  Past Medical History:   Diagnosis Date    Personal history of other diseases of the respiratory system 2019    History of sinusitis    Sleep apnea     Per patient he was diagnosed 20 years ago after his sleep study and was never able to tolerate a CPAP machine     SURGICAL HISTORY  Past Surgical History:    Procedure Laterality Date    COLONOSCOPY  2007     ANESTHESIA HISTORY  Denies problems with anesthesia in the past such as PONV, prolonged sedation, awareness, dental damage, aspiration, cardiac arrest, difficult intubation, or unexpected hospital admissions.  Denies family history of malignant hyperthermia, or pseudocholinesterase deficiency.  Patient's only anesthesia history was a colonoscopy done 17 years ago in 2007.    SOCIAL HISTORY  Former cigarette smoker quit in 2022 with a 40-pack-year history of smoking; EtOH: Occasional use; denies recreational drug use..  Patient does not engage in regular exercise, works as a , and has COPD.  He has complaints of shortness of breath for the past 6 months but states it does not impact his ability to do moderate ADLs such as walk up a flight of stairs, vacuuming, wash floors, grocery shop.  Patient denies history of chest pain.  Patient also with a 40-pack-year smoking history.  METS 3.5    FAMILY HISTORY  Family History   Problem Relation Name Age of Onset    Prostate cancer Brother       ALLERGIES  No Known Allergies    MEDICATIONS  No current facility-administered medications for this encounter.    Current Outpatient Medications:     albuterol 90 mcg/actuation inhaler, Inhale 2 puffs every 6 hours if needed for wheezing., Disp: 18 g, Rfl: 11    fluticasone (Flonase) 50 mcg/actuation nasal spray, Administer 2 sprays into each nostril once daily. Shake gently. Before first use, prime pump. After use, clean tip and replace cap., Disp: 16 g, Rfl: 6    pantoprazole (ProtoNix) 40 mg EC tablet, TAKE 1 TABLET BY MOUTH EVERY DAY, Disp: 90 tablet, Rfl: 0    tiotropium-olodateroL (Stiolto Respimat) 2.5-2.5 mcg/actuation mist inhaler, Inhale 2 Inhalations once daily., Disp: , Rfl:     cetirizine (ZyrTEC) 10 mg tablet, Take 1 tablet (10 mg) by mouth once daily. (Patient not taking: Reported on 5/14/2024), Disp: 30 tablet, Rfl: 11    clotrimazole-betamethasone  (Lotrisone) cream, Apply twice daily to outer lower lip for cheilitis (Patient not taking: Reported on 5/14/2024), Disp: 15 g, Rfl: 1    mometasone furoate, bulk, 100 % powder, Compound 1 mg capsule to be added to sinus rinse twice daily. (Patient not taking: Reported on 5/14/2024), Disp: 180 g, Rfl: 0    Review of Systems   HENT:  Positive for sinus pressure and sinus pain.    Respiratory:  Positive for apnea (Diagnosed with sleep apnea 20 years ago was never able to tolerate CPAP) and shortness of breath.    All other systems reviewed and are negative.    PHYSICAL EXAM  Deferred    AIRWAY EXAM  Deferred    VITALS  No vitals taken for telemedicine visit  Height: 6 feet 0 inches; weight: 201 pounds; BMI: 27.26  BP Readings from Last 6 Encounters:   06/20/24 125/82   04/05/24 124/81   01/11/24 144/88   01/04/24 114/70   07/19/23 116/82   02/09/23 120/78     LABS  Lab Results   Component Value Date    WBC 7.3 04/05/2024    HGB 13.7 04/05/2024    HCT 41.5 04/05/2024    MCV 92 04/05/2024     04/05/2024     Lab Results   Component Value Date    GLUCOSE 88 08/03/2023    CALCIUM 9.5 08/03/2023     08/03/2023    K 4.3 08/03/2023    CO2 29 08/03/2023     08/03/2023    BUN 10 08/03/2023    CREATININE 1.14 08/03/2023     Lab orders placed for CMP on 06/21/2024.  Patient expressed understanding that lab work was to be completed before 1 week of surgery.    IMAGING  Order for echocardiogram placed on 04/05/2024 for ongoing dyspnea to rule out cardiac cause.  Patient states he is going to have this procedure done within the next 2 weeks.    EKG from 03/03/2022  Sinus rhythm normal EKG    CT of chest done on 04/22/2024   IMPRESSION:  1. Resolution of previously new scattered areas of mild bronchiolitis  in the right lung. Underlying smoking-related lung disease and stable  small nodular densities which are likely benign    PFT done on 05/20/2024  FEV1/FVC is reduced; FEV1 is mildly reduced; F VC is normal.   Following administration of bronchodilators there was a significant improvement in both FEV1 and FVC.  Results indicate an airflow obstructive defect, moderate severity, lung volumes by plethysmography indicate hyperinflation process.    ASSESSMENT/PLAN  Chronic sphenoidal sinusitis  Bilateral endoscopic sinus surgery with IGS      This note was created in part upon personal review of patient's medical records.  Speech recognition transcription software was used in the creation of this note. Despite proofreading, several typographical errors might be present that might affect the meaning of the content.

## 2024-06-22 ENCOUNTER — LAB (OUTPATIENT)
Dept: LAB | Facility: LAB | Age: 69
End: 2024-06-22
Payer: MEDICARE

## 2024-06-22 DIAGNOSIS — J44.9 CHRONIC OBSTRUCTIVE PULMONARY DISEASE, UNSPECIFIED COPD TYPE (MULTI): ICD-10-CM

## 2024-06-22 DIAGNOSIS — Z41.9 ELECTIVE SURGERY: ICD-10-CM

## 2024-06-22 LAB
ALBUMIN SERPL BCP-MCNC: 4.5 G/DL (ref 3.4–5)
ALP SERPL-CCNC: 42 U/L (ref 33–136)
ALT SERPL W P-5'-P-CCNC: 9 U/L (ref 10–52)
ANION GAP SERPL CALC-SCNC: 12 MMOL/L (ref 10–20)
AST SERPL W P-5'-P-CCNC: 12 U/L (ref 9–39)
BILIRUB SERPL-MCNC: 1.2 MG/DL (ref 0–1.2)
BUN SERPL-MCNC: 13 MG/DL (ref 6–23)
CALCIUM SERPL-MCNC: 9.3 MG/DL (ref 8.6–10.6)
CHLORIDE SERPL-SCNC: 104 MMOL/L (ref 98–107)
CO2 SERPL-SCNC: 27 MMOL/L (ref 21–32)
CREAT SERPL-MCNC: 1.1 MG/DL (ref 0.5–1.3)
EGFRCR SERPLBLD CKD-EPI 2021: 74 ML/MIN/1.73M*2
GLUCOSE SERPL-MCNC: 93 MG/DL (ref 74–99)
POTASSIUM SERPL-SCNC: 4.4 MMOL/L (ref 3.5–5.3)
PROT SERPL-MCNC: 6.9 G/DL (ref 6.4–8.2)
SODIUM SERPL-SCNC: 139 MMOL/L (ref 136–145)

## 2024-06-22 PROCEDURE — 80053 COMPREHEN METABOLIC PANEL: CPT

## 2024-06-22 PROCEDURE — 36415 COLL VENOUS BLD VENIPUNCTURE: CPT

## 2024-06-25 ENCOUNTER — HOSPITAL ENCOUNTER (OUTPATIENT)
Dept: CARDIOLOGY | Facility: CLINIC | Age: 69
Discharge: HOME | End: 2024-06-25
Payer: MEDICARE

## 2024-06-25 VITALS — HEIGHT: 72 IN | WEIGHT: 201 LBS | BODY MASS INDEX: 27.22 KG/M2

## 2024-06-25 DIAGNOSIS — R06.09 DYSPNEA ON EXERTION: ICD-10-CM

## 2024-06-25 LAB
AORTIC VALVE MEAN GRADIENT: 2 MMHG
AORTIC VALVE PEAK VELOCITY: 1.1 M/S
AV PEAK GRADIENT: 4.8 MMHG
AVA (PEAK VEL): 2.82 CM2
AVA (VTI): 2.87 CM2
EJECTION FRACTION APICAL 4 CHAMBER: 54.9
LEFT VENTRICLE INTERNAL DIMENSION DIASTOLE: 5.3 CM (ref 3.5–6)
LEFT VENTRICULAR OUTFLOW TRACT DIAMETER: 2.1 CM
LV EJECTION FRACTION BIPLANE: 55 %
MITRAL VALVE E/A RATIO: 0.64
MITRAL VALVE E/E' RATIO: 3.7
RIGHT VENTRICLE PEAK SYSTOLIC PRESSURE: 19 MMHG

## 2024-06-25 PROCEDURE — 93306 TTE W/DOPPLER COMPLETE: CPT | Performed by: INTERNAL MEDICINE

## 2024-06-25 PROCEDURE — 93306 TTE W/DOPPLER COMPLETE: CPT

## 2024-07-10 ENCOUNTER — ANESTHESIA EVENT (OUTPATIENT)
Dept: OPERATING ROOM | Facility: CLINIC | Age: 69
End: 2024-07-10
Payer: MEDICARE

## 2024-07-15 ENCOUNTER — HOSPITAL ENCOUNTER (OUTPATIENT)
Facility: CLINIC | Age: 69
Setting detail: OUTPATIENT SURGERY
Discharge: HOME | End: 2024-07-15
Attending: OTOLARYNGOLOGY | Admitting: OTOLARYNGOLOGY
Payer: MEDICARE

## 2024-07-15 ENCOUNTER — ANESTHESIA (OUTPATIENT)
Dept: OPERATING ROOM | Facility: CLINIC | Age: 69
End: 2024-07-15
Payer: MEDICARE

## 2024-07-15 VITALS
OXYGEN SATURATION: 96 % | HEART RATE: 58 BPM | RESPIRATION RATE: 18 BRPM | HEIGHT: 72 IN | SYSTOLIC BLOOD PRESSURE: 115 MMHG | TEMPERATURE: 97.2 F | WEIGHT: 201.72 LBS | BODY MASS INDEX: 27.32 KG/M2 | DIASTOLIC BLOOD PRESSURE: 79 MMHG

## 2024-07-15 DIAGNOSIS — J34.2 DEVIATED NASAL SEPTUM: ICD-10-CM

## 2024-07-15 DIAGNOSIS — Z41.9 ELECTIVE SURGERY: ICD-10-CM

## 2024-07-15 DIAGNOSIS — J32.3 CHRONIC SPHENOIDAL SINUSITIS: Primary | ICD-10-CM

## 2024-07-15 DIAGNOSIS — J44.9 CHRONIC OBSTRUCTIVE PULMONARY DISEASE, UNSPECIFIED COPD TYPE (MULTI): ICD-10-CM

## 2024-07-15 PROCEDURE — 7100000001 HC RECOVERY ROOM TIME - INITIAL BASE CHARGE: Performed by: OTOLARYNGOLOGY

## 2024-07-15 PROCEDURE — 2500000002 HC RX 250 W HCPCS SELF ADMINISTERED DRUGS (ALT 637 FOR MEDICARE OP, ALT 636 FOR OP/ED): Performed by: ANESTHESIOLOGY

## 2024-07-15 PROCEDURE — 61782 SCAN PROC CRANIAL EXTRA: CPT | Performed by: OTOLARYNGOLOGY

## 2024-07-15 PROCEDURE — 2500000001 HC RX 250 WO HCPCS SELF ADMINISTERED DRUGS (ALT 637 FOR MEDICARE OP): Performed by: ANESTHESIOLOGY

## 2024-07-15 PROCEDURE — 2500000004 HC RX 250 GENERAL PHARMACY W/ HCPCS (ALT 636 FOR OP/ED): Performed by: NURSE ANESTHETIST, CERTIFIED REGISTERED

## 2024-07-15 PROCEDURE — 7100000002 HC RECOVERY ROOM TIME - EACH INCREMENTAL 1 MINUTE: Performed by: OTOLARYNGOLOGY

## 2024-07-15 PROCEDURE — 3600000003 HC OR TIME - INITIAL BASE CHARGE - PROCEDURE LEVEL THREE: Performed by: OTOLARYNGOLOGY

## 2024-07-15 PROCEDURE — 2500000001 HC RX 250 WO HCPCS SELF ADMINISTERED DRUGS (ALT 637 FOR MEDICARE OP): Performed by: OTOLARYNGOLOGY

## 2024-07-15 PROCEDURE — 7100000009 HC PHASE TWO TIME - INITIAL BASE CHARGE: Performed by: OTOLARYNGOLOGY

## 2024-07-15 PROCEDURE — 31287 NASAL/SINUS ENDOSCOPY SURG: CPT | Performed by: OTOLARYNGOLOGY

## 2024-07-15 PROCEDURE — 3600000008 HC OR TIME - EACH INCREMENTAL 1 MINUTE - PROCEDURE LEVEL THREE: Performed by: OTOLARYNGOLOGY

## 2024-07-15 PROCEDURE — 3700000001 HC GENERAL ANESTHESIA TIME - INITIAL BASE CHARGE: Performed by: OTOLARYNGOLOGY

## 2024-07-15 PROCEDURE — 2500000001 HC RX 250 WO HCPCS SELF ADMINISTERED DRUGS (ALT 637 FOR MEDICARE OP): Performed by: NURSE ANESTHETIST, CERTIFIED REGISTERED

## 2024-07-15 PROCEDURE — 3700000002 HC GENERAL ANESTHESIA TIME - EACH INCREMENTAL 1 MINUTE: Performed by: OTOLARYNGOLOGY

## 2024-07-15 PROCEDURE — 2500000005 HC RX 250 GENERAL PHARMACY W/O HCPCS: Performed by: OTOLARYNGOLOGY

## 2024-07-15 PROCEDURE — 2720000007 HC OR 272 NO HCPCS: Performed by: OTOLARYNGOLOGY

## 2024-07-15 PROCEDURE — 2500000005 HC RX 250 GENERAL PHARMACY W/O HCPCS: Performed by: NURSE ANESTHETIST, CERTIFIED REGISTERED

## 2024-07-15 PROCEDURE — 7100000010 HC PHASE TWO TIME - EACH INCREMENTAL 1 MINUTE: Performed by: OTOLARYNGOLOGY

## 2024-07-15 RX ORDER — LIDOCAINE IN NACL,ISO-OSMOT/PF 30 MG/3 ML
0.1 SYRINGE (ML) INJECTION ONCE
Status: DISCONTINUED | OUTPATIENT
Start: 2024-07-15 | End: 2024-07-15 | Stop reason: HOSPADM

## 2024-07-15 RX ORDER — ONDANSETRON HYDROCHLORIDE 2 MG/ML
INJECTION, SOLUTION INTRAVENOUS AS NEEDED
Status: DISCONTINUED | OUTPATIENT
Start: 2024-07-15 | End: 2024-07-15

## 2024-07-15 RX ORDER — SODIUM CHLORIDE 0.9 G/100ML
IRRIGANT IRRIGATION AS NEEDED
Status: DISCONTINUED | OUTPATIENT
Start: 2024-07-15 | End: 2024-07-15 | Stop reason: HOSPADM

## 2024-07-15 RX ORDER — IBUPROFEN 400 MG/1
400 TABLET ORAL EVERY 6 HOURS PRN
Qty: 28 TABLET | Refills: 0 | Status: SHIPPED | OUTPATIENT
Start: 2024-07-15 | End: 2024-07-29

## 2024-07-15 RX ORDER — OXYMETAZOLINE HCL 0.05 %
SPRAY, NON-AEROSOL (ML) NASAL AS NEEDED
Status: DISCONTINUED | OUTPATIENT
Start: 2024-07-15 | End: 2024-07-15 | Stop reason: HOSPADM

## 2024-07-15 RX ORDER — HYDROMORPHONE HYDROCHLORIDE 0.2 MG/ML
0.2 INJECTION INTRAMUSCULAR; INTRAVENOUS; SUBCUTANEOUS EVERY 5 MIN PRN
Status: DISCONTINUED | OUTPATIENT
Start: 2024-07-15 | End: 2024-07-15 | Stop reason: HOSPADM

## 2024-07-15 RX ORDER — PROPOFOL 10 MG/ML
INJECTION, EMULSION INTRAVENOUS AS NEEDED
Status: DISCONTINUED | OUTPATIENT
Start: 2024-07-15 | End: 2024-07-15

## 2024-07-15 RX ORDER — LIDOCAINE HYDROCHLORIDE 20 MG/ML
INJECTION, SOLUTION INFILTRATION; PERINEURAL AS NEEDED
Status: DISCONTINUED | OUTPATIENT
Start: 2024-07-15 | End: 2024-07-15

## 2024-07-15 RX ORDER — CEFAZOLIN 1 G/1
INJECTION, POWDER, FOR SOLUTION INTRAVENOUS AS NEEDED
Status: DISCONTINUED | OUTPATIENT
Start: 2024-07-15 | End: 2024-07-15

## 2024-07-15 RX ORDER — HYDROMORPHONE HYDROCHLORIDE 1 MG/ML
0.5 INJECTION, SOLUTION INTRAMUSCULAR; INTRAVENOUS; SUBCUTANEOUS EVERY 5 MIN PRN
Status: DISCONTINUED | OUTPATIENT
Start: 2024-07-15 | End: 2024-07-15 | Stop reason: HOSPADM

## 2024-07-15 RX ORDER — OXYCODONE HYDROCHLORIDE 5 MG/1
5 TABLET ORAL EVERY 4 HOURS PRN
Status: DISCONTINUED | OUTPATIENT
Start: 2024-07-15 | End: 2024-07-15 | Stop reason: HOSPADM

## 2024-07-15 RX ORDER — AMOXICILLIN AND CLAVULANATE POTASSIUM 875; 125 MG/1; MG/1
875 TABLET, FILM COATED ORAL 2 TIMES DAILY
Qty: 20 TABLET | Refills: 0 | Status: SHIPPED | OUTPATIENT
Start: 2024-07-15 | End: 2024-07-25

## 2024-07-15 RX ORDER — FENTANYL CITRATE 50 UG/ML
INJECTION, SOLUTION INTRAMUSCULAR; INTRAVENOUS AS NEEDED
Status: DISCONTINUED | OUTPATIENT
Start: 2024-07-15 | End: 2024-07-15

## 2024-07-15 RX ORDER — LIDOCAINE HYDROCHLORIDE AND EPINEPHRINE 10; 10 MG/ML; UG/ML
INJECTION, SOLUTION INFILTRATION; PERINEURAL AS NEEDED
Status: DISCONTINUED | OUTPATIENT
Start: 2024-07-15 | End: 2024-07-15 | Stop reason: HOSPADM

## 2024-07-15 RX ORDER — ACETAMINOPHEN 325 MG/1
650 TABLET ORAL EVERY 6 HOURS PRN
Qty: 112 TABLET | Refills: 0 | Status: SHIPPED | OUTPATIENT
Start: 2024-07-15 | End: 2024-07-29

## 2024-07-15 RX ORDER — LIDOCAINE HYDROCHLORIDE 40 MG/ML
SOLUTION TOPICAL AS NEEDED
Status: DISCONTINUED | OUTPATIENT
Start: 2024-07-15 | End: 2024-07-15

## 2024-07-15 RX ORDER — ACETAMINOPHEN 325 MG/1
TABLET ORAL AS NEEDED
Status: DISCONTINUED | OUTPATIENT
Start: 2024-07-15 | End: 2024-07-15

## 2024-07-15 RX ORDER — ONDANSETRON HYDROCHLORIDE 2 MG/ML
4 INJECTION, SOLUTION INTRAVENOUS ONCE AS NEEDED
Status: DISCONTINUED | OUTPATIENT
Start: 2024-07-15 | End: 2024-07-15 | Stop reason: HOSPADM

## 2024-07-15 RX ORDER — SUCCINYLCHOLINE CHLORIDE 100 MG/5ML
SYRINGE (ML) INTRAVENOUS AS NEEDED
Status: DISCONTINUED | OUTPATIENT
Start: 2024-07-15 | End: 2024-07-15

## 2024-07-15 RX ORDER — SODIUM CHLORIDE, SODIUM LACTATE, POTASSIUM CHLORIDE, CALCIUM CHLORIDE 600; 310; 30; 20 MG/100ML; MG/100ML; MG/100ML; MG/100ML
100 INJECTION, SOLUTION INTRAVENOUS CONTINUOUS
Status: DISCONTINUED | OUTPATIENT
Start: 2024-07-15 | End: 2024-07-15 | Stop reason: HOSPADM

## 2024-07-15 RX ORDER — APREPITANT 40 MG/1
CAPSULE ORAL AS NEEDED
Status: DISCONTINUED | OUTPATIENT
Start: 2024-07-15 | End: 2024-07-15

## 2024-07-15 RX ORDER — MIDAZOLAM HYDROCHLORIDE 1 MG/ML
INJECTION, SOLUTION INTRAMUSCULAR; INTRAVENOUS AS NEEDED
Status: DISCONTINUED | OUTPATIENT
Start: 2024-07-15 | End: 2024-07-15

## 2024-07-15 RX ORDER — METHOCARBAMOL 100 MG/ML
INJECTION, SOLUTION INTRAMUSCULAR; INTRAVENOUS AS NEEDED
Status: DISCONTINUED | OUTPATIENT
Start: 2024-07-15 | End: 2024-07-15

## 2024-07-15 RX ORDER — ALBUTEROL SULFATE 0.83 MG/ML
2.5 SOLUTION RESPIRATORY (INHALATION) ONCE AS NEEDED
Status: DISCONTINUED | OUTPATIENT
Start: 2024-07-15 | End: 2024-07-15 | Stop reason: HOSPADM

## 2024-07-15 RX ORDER — LABETALOL HYDROCHLORIDE 5 MG/ML
5 INJECTION, SOLUTION INTRAVENOUS ONCE AS NEEDED
Status: DISCONTINUED | OUTPATIENT
Start: 2024-07-15 | End: 2024-07-15 | Stop reason: HOSPADM

## 2024-07-15 RX ORDER — GABAPENTIN 300 MG/1
CAPSULE ORAL AS NEEDED
Status: DISCONTINUED | OUTPATIENT
Start: 2024-07-15 | End: 2024-07-15

## 2024-07-15 ASSESSMENT — PAIN - FUNCTIONAL ASSESSMENT
PAIN_FUNCTIONAL_ASSESSMENT: 0-10

## 2024-07-15 ASSESSMENT — PAIN SCALES - GENERAL
PAINLEVEL_OUTOF10: 0 - NO PAIN

## 2024-07-15 ASSESSMENT — COLUMBIA-SUICIDE SEVERITY RATING SCALE - C-SSRS
1. IN THE PAST MONTH, HAVE YOU WISHED YOU WERE DEAD OR WISHED YOU COULD GO TO SLEEP AND NOT WAKE UP?: NO
2. HAVE YOU ACTUALLY HAD ANY THOUGHTS OF KILLING YOURSELF?: NO
6. HAVE YOU EVER DONE ANYTHING, STARTED TO DO ANYTHING, OR PREPARED TO DO ANYTHING TO END YOUR LIFE?: NO

## 2024-07-15 NOTE — BRIEF OP NOTE
Date: 7/15/2024  OR Location: [facility]    Name: Gary Contreras : 1955, Age: 69 y.o., MRN: [id number], Sex: male    Diagnosis  Pre-op Diagnosis      * Chronic sphenoidal sinusitis [J32.3]     * Deviated nasal septum [J34.2] Post-op Diagnosis     * Chronic sphenoidal sinusitis [J32.3]     * Deviated nasal septum [J34.2]     Procedures  Bilateral endoscopic sinus surgery with image guidance.    [facility]  99174 - MO STRTCTC CPTR ASSTD PX EXTRADURAL CRANIAL    MO NASAL/SINUS ENDOSCOPY W/SPHENOIDOTOMY [84665]  Surgeons      * Ryan Adams V - Primary    Resident/Fellow/Other Assistant:  Sudha Bradley    Procedure Summary  Anesthesia: General  ASA: II  Anesthesia Staff: Anesthesiologist: Melisa Herman DO  CRNA: CAN Day-CRNA  Estimated Blood Loss: 50 mL  Intra-op Medications:   Administrations occurring from 1000 to 1200 on 07/15/24:   Medication Name Total Dose   lidocaine-epinephrine (Xylocaine W/EPI) 1 %-1:100,000 injection 5 mL   sodium chloride 0.9 % irrigation solution 1,000 mL   oxymetazoline (Afrin) 0.05 % nasal spray 3 spray   sodium chloride 0.9 % irrigation solution 500 mL              Anesthesia Record               Intraprocedure I/O Totals          Intake    Propofol Drip 0.00 mL    The total shown is the total volume documented since Anesthesia Start was filed.    Total Intake 0 mL          Specimen:   ID Type Source Tests Collected by Time   1 : Left Sphenoid Sinus Tissue SINUS CONTENTS LEFT SURGICAL PATHOLOGY EXAM Sudha Bradley MD 7/15/2024 1127   2 : MICRODEBRIDER CONTENTS Tissue SINUS CONTENTS LEFT SURGICAL PATHOLOGY EXAM Ryan GONZALEZ MD 7/15/2024 1200        Staff:   Circulator: Parth Hughes Person: Tamara Ya Circulator: Gt    Findings: Debris within the left sphenoid sinus, possibly fungal. Septoplasty not required for access, though there was a septal deviation to the left.    Complications:  None; patient tolerated the procedure  well.     Disposition: PACU - hemodynamically stable.  Condition: stable  Specimens Collected:   ID Type Source Tests Collected by Time   1 : Left Sphenoid Sinus Tissue SINUS CONTENTS LEFT SURGICAL PATHOLOGY EXAM Sudha Bradley MD 7/15/2024 1127   2 : MICRODEBRIDER CONTENTS Tissue SINUS CONTENTS LEFT SURGICAL PATHOLOGY EXAM Ryan GONZALEZ MD 7/15/2024 1200     Attending Attestation:     Ryan Adams V  Phone Number: [phone]

## 2024-07-15 NOTE — OP NOTE
Bilateral endoscopic sinus surgery with image guidance. (B), Navigation-Assisted Surgery Operative Note     Date: 7/15/2024  OR Location: [facility] OR    Name: Gary Contreras YOB: 1955, Age: 69 y.o., MRN: [id number], Sex: male    Diagnosis  Pre-op Diagnosis      * Chronic sphenoidal sinusitis [J32.3]     * Deviated nasal septum [J34.2] Post-op Diagnosis     * Chronic sphenoidal sinusitis [J32.3]     * Deviated nasal septum [J34.2]     Procedures  Bilateral endoscopic sinus surgery with image guidance.    Navigation-Assisted Surgery  82221 - NM STRTCTC CPTR ASSTD PX EXTRADURAL CRANIAL    NM NASAL/SINUS ENDOSCOPY W/SPHENOIDOTOMY [24662]  Surgeons      * Ryan Adams V - Primary    Resident/Fellow/Other Assistant:  Sudha Bradley    Procedure Summary  Anesthesia: General  ASA: II  Anesthesia Staff: Anesthesiologist: Melisa Herman DO  CRNA: BRITTNI Day  Estimated Blood Loss: 50 mL  Intra-op Medications:   Administrations occurring from 1000 to 1200 on 07/15/24:   Medication Name Total Dose   lidocaine-epinephrine (Xylocaine W/EPI) 1 %-1:100,000 injection 5 mL   sodium chloride 0.9 % irrigation solution 1,000 mL   oxymetazoline (Afrin) 0.05 % nasal spray 3 spray   sodium chloride 0.9 % irrigation solution 500 mL              Anesthesia Record               Intraprocedure I/O Totals          Intake    Propofol Drip 0.00 mL    The total shown is the total volume documented since Anesthesia Start was filed.    Total Intake 0 mL          Specimen:   ID Type Source Tests Collected by Time   1 : Left Sphenoid Sinus Tissue SINUS CONTENTS LEFT SURGICAL PATHOLOGY EXAM Sudha Bradley MD 7/15/2024 1127   2 : MICRODEBRIDER CONTENTS Tissue SINUS CONTENTS LEFT SURGICAL PATHOLOGY EXAM Ryan GONZALEZ MD 7/15/2024 1200        Staff:   Romelia: Parth Hughes Person: Tamara Ya Circulator: Gt       Findings: Debris within the left sphenoid sinus, possibly fungal. Septoplasty not required for  access, though there was a septal deviation to the left.     Indications: Gary Contreras is an 69 y.o. male who is having surgery for Chronic sphenoidal sinusitis [J32.3]  Deviated nasal septum [J34.2].    The patient was seen in the preoperative area. The risks, benefits, complications, treatment options, non-operative alternatives, expected recovery and outcomes were discussed with the patient. The possibilities of reaction to medication, pulmonary aspiration, injury to surrounding structures, bleeding, recurrent infection, the need for additional procedures, failure to diagnose a condition, and creating a complication requiring transfusion or operation were discussed with the patient. The patient concurred with the proposed plan, giving informed consent.  The site of surgery was properly noted/marked if necessary per policy. The patient has been actively warmed in preoperative area. Preoperative antibiotics have been ordered and given within 1 hours of incision. Venous thrombosis prophylaxis have been ordered including bilateral sequential compression devices    Procedure Details:   The patient was seen in the preoperative area where consent was confirmed and all questions were answered. The patient was brought back to the operating room where a full team timeout was performed. The patient was then induced and intubated by the anesthesia team. The table was turned 90 degrees. [place] oxymetazoline was applied to both nasal cavities. The patient was placed in slight reverse Trendelenburg position.    The patient's CT scan was loaded onto the image guidance system. The system was then registered to the patient and was confirmed in three planes. Image guidance was used throughout the case to confirm the location of vital neurovascular structures.    The patient was prepped and draped in a sterile fashion and a second confirmatory timeout was performed.     We began first with evaluation of the nasal cavities with a  0 degree endoscope bilaterally; the above findings were noted.     We continued with a left-sided sphenoidotomy. The left middle and superior turbinates were lateralized using a freer. The inferior-most aspect of the superior turbinate was resected with a straight thru-cut until the area of the presumed sphenoid os could be visualized. The entrance to the sphenoid was confirmed with our navigation probe. The sphenoid os was entered and expanded with the suction Anna elevator and then widened with the use of [place]ison rongeurs. Polypoid tissue and debris within the sphenoid sinus was resected and irrigated. All loose edges were cleaned with the microdebrider.     Finally, hemostasis was obtained using suction bovie cautery. Final irrigation was performed. An orogastric tube was passed into the nasopharynx and blood was evacuated.    All instrumentation was then removed from the patient who was then turned back and returned to the care of the anesthesia team for emergence and extubation. The patient was transported to the recovery area. There were no immediate complications appreciated.    Complications:  None; patient tolerated the procedure well.    Disposition: PACU - hemodynamically stable.  Condition: stable     Attending Attestation:     Ryan Adams V  Phone Number: [phone]

## 2024-07-15 NOTE — ANESTHESIA PROCEDURE NOTES
Airway  Date/Time: 7/15/2024 11:10 AM  Urgency: elective    Airway not difficult    Staffing  Performed: CRNA   Authorized by: Melisa Herman DO    Performed by: CAN Day-PUJA  Patient location during procedure: OR    Indications and Patient Condition  Indications for airway management: anesthesia and airway protection  Spontaneous ventilation: present  Sedation level: deep  Preoxygenated: yes  Patient position: sniffing  MILS maintained throughout  Mask difficulty assessment: 1 - vent by mask    Final Airway Details  Final airway type: endotracheal airway      Successful airway: ETT  Cuffed: yes   Successful intubation technique: direct laryngoscopy  Blade: Syed  Blade size: #4  ETT size (mm): 7.5  Cormack-Lehane Classification: grade I - full view of glottis  Placement verified by: chest auscultation and capnometry   Measured from: lips  ETT to lips (cm): 24  Number of attempts at approach: 1

## 2024-07-15 NOTE — H&P
History Of Present Illness  Gary Contreras is a 69 y.o. male presenting with chronic sinusitis unresponsive to medical therapy.     Past Medical History  Past Medical History:   Diagnosis Date    COPD (chronic obstructive pulmonary disease) (Multi)     Personal history of other diseases of the respiratory system 2019    History of sinusitis    Sleep apnea     Per patient he was diagnosed 20 years ago after his sleep study and was never able to tolerate a CPAP machine       Surgical History  Past Surgical History:   Procedure Laterality Date    COLONOSCOPY          Social History  Social History     Socioeconomic History    Marital status:      Spouse name: Not on file    Number of children: Not on file    Years of education: Not on file    Highest education level: Not on file   Occupational History    Not on file   Tobacco Use    Smoking status: Former     Current packs/day: 0.00     Types: Cigarettes     Quit date:      Years since quittin.5    Smokeless tobacco: Former    Tobacco comments:     40 yr    Substance and Sexual Activity    Alcohol use: Yes     Comment: occassionally    Drug use: Never    Sexual activity: Defer   Other Topics Concern    Not on file   Social History Narrative    Not on file     Social Determinants of Health     Financial Resource Strain: Not on file   Food Insecurity: Not on file   Transportation Needs: Not on file   Physical Activity: Not on file   Stress: Not on file   Social Connections: Not on file   Intimate Partner Violence: Not on file   Housing Stability: Not on file       Family History  Family History   Problem Relation Name Age of Onset    Prostate cancer Brother          Allergies  No Known Allergies    Review of Systems  A 12-point review of systems was performed and noted be negative except for that which was mentioned in the history of present illness.     Physical Exam  Gen- NAD  Resp- nonlabored on RA, symmetric chest rise  Head/Face- NCAT, no  masses or lesions  Eyes- EOMI, clear sclera  Ears- normal external ears, no gross lesions of EACs  Nose- anterior nares clear, no bleeding or drainage  Mouth- lips without lesions, no excessive drooling  Neuro- alert and interactive     Last Recorded Vitals  Vitals:    07/15/24 0912   BP: (!) 164/94   Pulse: 66   Resp: 16   Temp: 36.7 °C (98.1 °F)   SpO2: 96%         Assessment/Plan   Gary Contreras is a 69 y.o. male presenting with chronic sinusitis unresponsive to medical therapy.    - Proceed with surgery as scheduled    Sudha Bradley MD  PGY-4  Otolaryngology - Head and Neck Surgery

## 2024-07-15 NOTE — DISCHARGE INSTRUCTIONS
Ryan Adams MD, M.Eng.  [facility]  Department of Otolaryngology-Head & Neck Surgery  Division of Rhinology and Endoscopic Skull Base Surgery  [email or web address]    PHONE NUMBERS:    Emergency: Call 911     Urgent Issues/After Hours: Call [phone] and ask to speak to the ENT resident on-call for Otolaryngology/Dr. Adams    For regular, routine issues, feel free to call us at the office    WHAT TO DO     You need to follow-up with me at my outpatient clinic on the date provided to you.    Please call us to change date or time. Please keep in mind that the timing of your post-operative visit may affect the success of your surgery.    Call ASAP if you are experiencing any unexpected problems.    Familiarize yourself with these instructions. These instructions should replace any instructions given to you by the hospital. If you have questions, please call me.    Use the recommended medications and treatments as described.    Do not blow your nose until I say it is OK.     Call me with questions.  Also, please call me the day after surgery to let me know how you are doing.    WHAT TO EXPECT    Nasal Discharge & Bleeding  It is common to have mild bleeding and nasal drainage after nasal & sinus surgery.  After surgery, a “drip pad” may have been placed under your nose.  You may remove this at any point and can replace it if necessary, at your discretion.    Pain & Pressure  It is common to experience mild-to-moderate pain and pressure in your face and around your eyes. The pain usually is worst the first day after surgery but can continue for several days. Use your pain medications as described below. For any severe pain uncontrolled by medications, please contact the office or present to the emergency room.    Fatigue  It is common to feel mild to moderate fatigue for 7-10 days after surgery.    Nasal Congestion and Crusting    It is common to  experience worsened nasal congestion after surgery.  This is due to swelling and crusts.  Crusts are essentially scabs and mucus that build up in the nasal passages after surgery. Crusts eventually resolve.  Usually, I will remove some crusts during your postoperative visit.    Nasal irrigation helps to soften and remove scabs. We will tell you on the day of surgery when you can start irrigation    Nasal irrigation kits are available over the counter in the nasal section.  Common brands include SinuCleanse or NeilMed.    ACTIVITY     First 1-2 days after surgery: Plan to rest, but you may start light activities as tolerated.  Days 2 through 10 following surgery:  Light activity only until 10 days after surgery, gradually increase activity.    No extensive bending over for 7 days after surgery.    No lifting over 20 pounds for one week after surgery. No aerobic exercise until I clear you to do so.    You may shower starting 1 day after surgery    Call my office if you experience any of the following:    · Fever higher than 101 F  · Clear, watery nasal discharge  · Any visual changes or marked swelling around the eyes  · Severe headache or neck stiffness  · Brisk bleeding    NUTRITION     Day of surgery    Drink plenty of water / fluids  Eat light snacks as tolerated  It is common for people to have less of an appetite the day of surgery  Day after surgery: Advance your diet as tolerated    MEDICATION SAFETY TIPS    Use medications only as directed in the amounts specified  Avoid aspirin for 10 days.  Avoid fish oil (omega-3/6) and vitamin E for 1 week.  As your pain lessens, you may replace doses of prescription pain medications with acetaminophen (Tylenol).  However, if prescribed Lortab or Percocet (containing acetaminophen), Do Not take doses of prescription pain medications at the same time as Tylenol because this could cause an overdose of Tylenol.  Do Not drive or operate machinery if taking prescription pain  medications  Read each medication label carefully, ask your pharmacist if you have any questions regarding warnings on the label  Do not measure liquid with a kitchen spoon.  There are pediatric measuring devices available at the pharmacy.  Ask for one when you get your prescription filled.   Store all mediations out of reach of children.  Call the Poison Control Center if you or your child takes too much of any medicine or if your child consumes your medication [phone].    Tylenol given at 10:00am; next dose can be taken at 4:00pm if needed  Revised 6/2022

## 2024-07-15 NOTE — ANESTHESIA POSTPROCEDURE EVALUATION
Patient: Gary Contreras    Procedure Summary       Date: 07/15/24 Room / Location: [facility] OR 01 / Virtual [facility]    Anesthesia Start: 1101 Anesthesia Stop: 1216    Procedures:       Bilateral endoscopic sinus surgery with image guidance. (Bilateral: Nose)      Navigation-Assisted Surgery (Nose) Diagnosis:       Chronic sphenoidal sinusitis      Deviated nasal septum      (Chronic sphenoidal sinusitis [J32.3])      (Deviated nasal septum [J34.2])    Surgeons: Ryan GONZALEZ MD Responsible Provider: Melisa Herman DO    Anesthesia Type: general ASA Status: 2            Anesthesia Type: general    Vitals Value Taken Time   /79 07/15/24 1243   Temp 36.3 °C (97.3 °F) 07/15/24 1213   Pulse 51 07/15/24 1243   Resp 18 07/15/24 1243   SpO2 95 % 07/15/24 1243       Anesthesia Post Evaluation    Patient location during evaluation: PACU  Patient participation: complete - patient participated  Level of consciousness: awake  Pain management: satisfactory to patient  Multimodal analgesia pain management approach  Airway patency: patent  Cardiovascular status: acceptable  Respiratory status: acceptable  Hydration status: acceptable  Postoperative Nausea and Vomiting: none    No notable events documented.

## 2024-07-15 NOTE — ANESTHESIA PREPROCEDURE EVALUATION
Patient: Gary Contreras    Procedure Information       Date/Time: 07/15/24 1000    Procedures:       Bilateral endoscopic sinus surgery with image guidance. (Bilateral: Nose)      Navigation-Assisted Surgery (Nose)    Location: [facility] OR 01 / Virtual [facility] OR    Surgeons: Ryan GONZALEZ MD            Relevant Problems   Anesthesia (within normal limits)      Cardiac  > 4 mets   (+) Aneurysm of ascending aorta without rupture (CMS-HCC) (Being followed)      Pulmonary   (+) COPD (chronic obstructive pulmonary disease) (Multi)      Neuro (within normal limits)      GI   (+) GERD (gastroesophageal reflux disease) (Well controlled)      /Renal (within normal limits)      Liver (within normal limits)      Endocrine (within normal limits)      Hematology (within normal limits)      Musculoskeletal (within normal limits)      HEENT   (+) Chronic sphenoidal sinusitis   (+) Sinus pressure      ID (within normal limits)      Skin (within normal limits)      GYN (within normal limits)       Clinical information reviewed:   Tobacco  Allergies  Meds   Med Hx  Surg Hx   Fam Hx  Soc Hx        NPO Detail:  NPO/Void Status  Date of Last Liquid: 07/14/24  Time of Last Liquid: 1800  Date of Last Solid: 07/14/24  Time of Last Solid: 1800  Last Intake Type: Clear fluids; Food  Time of Last Void: 0800         Physical Exam    Airway  Mallampati: II  TM distance: >3 FB  Neck ROM: full     Cardiovascular    Dental - normal exam     Pulmonary    Abdominal        Anesthesia Plan    History of general anesthesia?: no  History of complications of general anesthesia?: no    ASA 2     general     intravenous induction   Anesthetic plan and risks discussed with patient.    Plan discussed with CRNA.

## 2024-07-16 ASSESSMENT — PAIN SCALES - GENERAL: PAINLEVEL_OUTOF10: 0 - NO PAIN

## 2024-07-23 ENCOUNTER — APPOINTMENT (OUTPATIENT)
Dept: OTOLARYNGOLOGY | Facility: CLINIC | Age: 69
End: 2024-07-23
Payer: MEDICARE

## 2024-07-23 VITALS — HEIGHT: 72 IN | WEIGHT: 200.4 LBS | BODY MASS INDEX: 27.14 KG/M2

## 2024-07-23 DIAGNOSIS — J32.9 SINUSITIS, UNSPECIFIED CHRONICITY, UNSPECIFIED LOCATION: Primary | ICD-10-CM

## 2024-07-23 DIAGNOSIS — K21.9 GASTRO-ESOPHAGEAL REFLUX DISEASE WITHOUT ESOPHAGITIS: ICD-10-CM

## 2024-07-23 DIAGNOSIS — J32.3 CHRONIC SPHENOIDAL SINUSITIS: ICD-10-CM

## 2024-07-23 PROCEDURE — 3008F BODY MASS INDEX DOCD: CPT | Performed by: OTOLARYNGOLOGY

## 2024-07-23 PROCEDURE — 1159F MED LIST DOCD IN RCRD: CPT | Performed by: OTOLARYNGOLOGY

## 2024-07-23 PROCEDURE — 99024 POSTOP FOLLOW-UP VISIT: CPT | Performed by: OTOLARYNGOLOGY

## 2024-07-23 PROCEDURE — 31231 NASAL ENDOSCOPY DX: CPT | Performed by: OTOLARYNGOLOGY

## 2024-07-23 PROCEDURE — 1036F TOBACCO NON-USER: CPT | Performed by: OTOLARYNGOLOGY

## 2024-07-23 NOTE — PROGRESS NOTES
Chief Complaint:    Postop follow-up    Referring Provider: No ref. provider found    History of Present Illness:    Gary Contreras is a 69 y.o. male, presenting for postoperative visit after undergoing endoscopic left sphenoid sinusotomy.  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  Pathology is currently pending.  ?  Review of Systems:    Review of symptoms was negative except for those stated including Cardiopulmonary, Genitourinary, Gastrointestinal, Psychological, Sleep pattern, Endocrine, Eyes, Neurologic, Musculoskeletal, Skin, Hematologic/Lymphatic and Allergic/Immunologic.     Medical History:    I have reviewed the patient's updated past medical history, surgical history, family history, social history, as well as current medications and allergies as of 7/23/2024. Changes to these items have been updated and marked as reviewed in the electronic medical record.    Physical Exam:    Vitals:  height is 1.829 m (6') and weight is 90.9 kg (200 lb 6.4 oz).   General: Patient doing well overall and is in no apparent distress.  Psych: Pleasant affect, and answers questions appropriately.  Head & Face: Symmetric facial movements  Eyes: Pupils equal, round, reactive.  Extraocular movements intact without gaze restrictions or nystagmus. No epiphora.  Ears:  External auditory canals are normal.  Tympanic membranes are clear.  No middle ear effusion is seen.  All middle ear landmarks are normal.  Nose: Anterior rhinoscopy revealed normal sinonasal mucosa. More posterior areas of the nasal cavity could not be completely examined.  Oral Cavity/Oropharynx:  Without lesions or masses to visual exam.  Neck: Supple without lymphadenopathy.  Lungs: Non-labored, and without evidence of stridor.  Cardiac: Pulses are strong, well-perfused.  Extremities: Without gross evidence of clubbing, cyanosis, or edema.  Neuro: Cranial nerves II-XII grossly intact; Intact facial  movements.    Procedure:  Rigid nasal endoscopy (20930)  Pre-procedure diagnosis/Indication for procedure:  To evaluate areas not visualized on anterior rhinoscopy   Anesthesia:  None  Description:  A 0-degree 3-mm rigid nasal endoscope was used to examine the left and right nasal cavities.  The nasal valve areas were examined for abnormalities or collapse.  The inferior and middle turbinates were evaluated.  The middle and superior meatuses, and the sphenoethmoid recesses were examined and inspected for mucopurulence and polyps. Once the endoscope was withdrawn, the patient was noted to have tolerated the procedure well without complications and was returned to ambulatory status.    Findings:    Bilateral nasal cavity appears to be healing well.  There is some mild left-sided sphenoethmoidal recess edema but the sphenoid sinus itself is grossly patent.  The nasopharynx is clear.  He tolerated the procedure well.      Assessment:     Gary Contreras is a 69 y.o. male with left chronic sphenoid sinusitis.  Status post endoscopic sinus surgery    Plan:      Gary is healing as expected.  I recommend that he continue his saline irrigations and follow-up with us in 3 to 4 weeks or as needed sooner.      Ryan Adams MD, M.Eng.   of Otolaryngology - Head & Neck Surgery  Division of Rhinology and Endoscopic Skull Base Surgery  [facility]

## 2024-07-24 RX ORDER — PANTOPRAZOLE SODIUM 40 MG/1
TABLET, DELAYED RELEASE ORAL
Qty: 90 TABLET | Refills: 0 | Status: SHIPPED | OUTPATIENT
Start: 2024-07-24

## 2024-07-25 LAB
LABORATORY COMMENT REPORT: NORMAL
PATH REPORT.FINAL DX SPEC: NORMAL
PATH REPORT.GROSS SPEC: NORMAL
PATH REPORT.RELEVANT HX SPEC: NORMAL
PATH REPORT.TOTAL CANCER: NORMAL

## 2024-08-06 ENCOUNTER — APPOINTMENT (OUTPATIENT)
Dept: OTOLARYNGOLOGY | Facility: CLINIC | Age: 69
End: 2024-08-06
Payer: MEDICARE

## 2024-08-06 DIAGNOSIS — G50.1 ATYPICAL FACE PAIN: Primary | ICD-10-CM

## 2024-08-06 DIAGNOSIS — J34.89 NASAL AND SINUS DISCHARGE: ICD-10-CM

## 2024-08-06 PROCEDURE — 1036F TOBACCO NON-USER: CPT | Performed by: OTOLARYNGOLOGY

## 2024-08-06 PROCEDURE — 31231 NASAL ENDOSCOPY DX: CPT | Performed by: OTOLARYNGOLOGY

## 2024-08-06 PROCEDURE — 1159F MED LIST DOCD IN RCRD: CPT | Performed by: OTOLARYNGOLOGY

## 2024-08-06 PROCEDURE — 1160F RVW MEDS BY RX/DR IN RCRD: CPT | Performed by: OTOLARYNGOLOGY

## 2024-08-06 PROCEDURE — 99213 OFFICE O/P EST LOW 20 MIN: CPT | Performed by: OTOLARYNGOLOGY

## 2024-08-06 RX ORDER — METHYLPREDNISOLONE 4 MG/1
TABLET ORAL
Qty: 21 TABLET | Refills: 0 | Status: SHIPPED | OUTPATIENT
Start: 2024-08-06 | End: 2024-08-13

## 2024-08-06 RX ORDER — AZELASTINE 1 MG/ML
2 SPRAY, METERED NASAL 2 TIMES DAILY
Qty: 30 ML | Refills: 3 | Status: SHIPPED | OUTPATIENT
Start: 2024-08-06 | End: 2024-09-05

## 2024-08-06 NOTE — PROGRESS NOTES
Chief Complaint:      Referring Provider: No ref. provider found    History of Present Illness:    Gary Contreras is a 69 y.o. male, presenting for follow-up visit.  He continues to have headaches despite healing from surgery.  He also describes throat clearing neck tension.  He is using fluticasone daily but discontinued his rinses.  He also discontinued his Zyrtec.  He reiterates today that his headaches seem to improve most consistently when he takes steroids by mouth.    Review of Systems:    Review of symptoms was negative except for those stated including Cardiopulmonary, Genitourinary, Gastrointestinal, Psychological, Sleep pattern, Endocrine, Eyes, Neurologic, Musculoskeletal, Skin, Hematologic/Lymphatic and Allergic/Immunologic.     Medical History:    I have reviewed the patient's updated past medical history, surgical history, family history, social history, as well as current medications and allergies as of 8/6/2024. Changes to these items have been updated and marked as reviewed in the electronic medical record.    Physical Exam:    Vitals:  vitals were not taken for this visit.   General: Patient doing well overall and is in no apparent distress.  Psych: Pleasant affect, and answers questions appropriately.  Head & Face: Symmetric facial movements  Eyes: Pupils equal, round, reactive.  Extraocular movements intact without gaze restrictions or nystagmus. No epiphora.  Ears:  External auditory canals are normal.  Tympanic membranes are clear.  No middle ear effusion is seen.  All middle ear landmarks are normal.  Nose: Anterior rhinoscopy revealed normal sinonasal mucosa. More posterior areas of the nasal cavity could not be completely examined.  Oral Cavity/Oropharynx:  Without lesions or masses to visual exam.  Neck: Supple without lymphadenopathy.  Lungs: Non-labored, and without evidence of stridor.  Cardiac: Pulses are strong, well-perfused.  Extremities: Without gross evidence of clubbing,  cyanosis, or edema.  Neuro: Cranial nerves II-XII grossly intact; Intact facial movements.    Procedure:  Rigid nasal endoscopy (50456)  Pre-procedure diagnosis/Indication for procedure:  To evaluate areas not visualized on anterior rhinoscopy   Anesthesia:  None  Description:  A 0-degree 3-mm rigid nasal endoscope was used to examine the left and right nasal cavities.  The nasal valve areas were examined for abnormalities or collapse.  The inferior and middle turbinates were evaluated.  The middle and superior meatuses, and the sphenoethmoid recesses were examined and inspected for mucopurulence and polyps. Once the endoscope was withdrawn, the patient was noted to have tolerated the procedure well without complications and was returned to ambulatory status.    Findings:    There is a persistent left-sided nasal septal deviation.  Middle meatus is clear bilaterally.  The nasopharynx and sphenoethmoidal recess are clear bilaterally.  The left sphenoid sinus is grossly patent.      Assessment:     Gary Contreras is a 69 y.o. male with chronic sphenoid sinusitis status post endoscopic sinus surgery.  Pathology consistent with mycetoma.  Improving on endoscopy however still with persistent headaches and throat clearing.    Plan:      I do not suspect that his throat clearing is due to sinusitis as his nasopharynx is clear there is no evidence of any excessive mucus production or mucopurulence.  I did offer an evaluation with laryngology however he declined.  Is seen and sinus has improved significantly however he is still having headaches.  He also describes neck tension and discomfort and I am talked to him about the possible relationship between these two.  I offered him an evaluation with neurology however he also declined.  I have prescribed him a Medrol Dosepak to determine if his headaches are consistently steroid responsive.  If so this may indicate an inflammatory condition not related to his history of  mycetoma.  I also prescribed Astelin for control of allergic rhinitis.  He will follow-up in 3 months or sooner if needed.      Ryan Adams MD, M.Eng.   of Otolaryngology - Head & Neck Surgery  Division of Rhinology and Endoscopic Skull Base Surgery  [facility]

## 2024-08-31 DIAGNOSIS — J34.89 NASAL AND SINUS DISCHARGE: ICD-10-CM

## 2024-09-03 DIAGNOSIS — J34.89 NASAL AND SINUS DISCHARGE: ICD-10-CM

## 2024-09-03 RX ORDER — AZELASTINE 1 MG/ML
SPRAY, METERED NASAL
Qty: 90 ML | Refills: 3 | Status: SHIPPED | OUTPATIENT
Start: 2024-09-03 | End: 2024-09-05

## 2024-09-05 DIAGNOSIS — J34.89 NASAL AND SINUS DISCHARGE: ICD-10-CM

## 2024-09-05 RX ORDER — AZELASTINE 1 MG/ML
SPRAY, METERED NASAL
Qty: 90 ML | Refills: 3 | Status: SHIPPED | OUTPATIENT
Start: 2024-09-05

## 2024-09-10 RX ORDER — AZELASTINE 1 MG/ML
2 SPRAY, METERED NASAL 2 TIMES DAILY
Qty: 90 ML | Refills: 3 | Status: SHIPPED | OUTPATIENT
Start: 2024-09-10

## 2024-10-01 ENCOUNTER — APPOINTMENT (OUTPATIENT)
Dept: OTOLARYNGOLOGY | Facility: CLINIC | Age: 69
End: 2024-10-01
Payer: MEDICARE

## 2024-10-01 VITALS — HEIGHT: 72 IN | WEIGHT: 198.5 LBS | BODY MASS INDEX: 26.89 KG/M2

## 2024-10-01 DIAGNOSIS — J32.3 CHRONIC SPHENOIDAL SINUSITIS: ICD-10-CM

## 2024-10-01 DIAGNOSIS — G50.1 ATYPICAL FACE PAIN: Primary | ICD-10-CM

## 2024-10-01 PROCEDURE — 31231 NASAL ENDOSCOPY DX: CPT | Performed by: OTOLARYNGOLOGY

## 2024-10-01 PROCEDURE — 1159F MED LIST DOCD IN RCRD: CPT | Performed by: OTOLARYNGOLOGY

## 2024-10-01 PROCEDURE — 1160F RVW MEDS BY RX/DR IN RCRD: CPT | Performed by: OTOLARYNGOLOGY

## 2024-10-01 PROCEDURE — 1126F AMNT PAIN NOTED NONE PRSNT: CPT | Performed by: OTOLARYNGOLOGY

## 2024-10-01 PROCEDURE — 99213 OFFICE O/P EST LOW 20 MIN: CPT | Performed by: OTOLARYNGOLOGY

## 2024-10-01 PROCEDURE — 3008F BODY MASS INDEX DOCD: CPT | Performed by: OTOLARYNGOLOGY

## 2024-10-01 ASSESSMENT — PAIN SCALES - GENERAL: PAINLEVEL: 0-NO PAIN

## 2024-10-03 NOTE — PROGRESS NOTES
Chief Complaint:  Follow-up    Referring Provider: No ref. provider found    History of Present Illness:    Gary Contreras is a 69 y.o. male, presenting for follow-up after undergoing endoscopic sphenoid sinusotomy.  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  Unfortunately Gary continues to experience repeated facial pain pressure neck pain and other symptoms which she was experiencing preoperatively.  He states he has not improved significantly.  He has stopped performing his irrigations.  He is not using medications any longer.  ?  Review of Systems:    Review of symptoms was negative except for those stated including Cardiopulmonary, Genitourinary, Gastrointestinal, Psychological, Sleep pattern, Endocrine, Eyes, Neurologic, Musculoskeletal, Skin, Hematologic/Lymphatic and Allergic/Immunologic.     Medical History:    I have reviewed the patient's updated past medical history, surgical history, family history, social history, as well as current medications and allergies as of 10/1/2024. Changes to these items have been updated and marked as reviewed in the electronic medical record.    Physical Exam:    Vitals:  height is 1.829 m (6') and weight is 90 kg (198 lb 8 oz).   General: Patient doing well overall and is in no apparent distress.  Psych: Pleasant affect, and answers questions appropriately.  Head & Face: Symmetric facial movements  Eyes: Pupils equal, round, reactive.  Extraocular movements intact without gaze restrictions or nystagmus. No epiphora.  Ears:  External auditory canals are normal.  Tympanic membranes are clear.  No middle ear effusion is seen.  All middle ear landmarks are normal.  Nose: Anterior rhinoscopy revealed normal sinonasal mucosa. More posterior areas of the nasal cavity could not be completely examined.  Oral Cavity/Oropharynx:  Without lesions or masses to visual exam.  Neck: Supple without lymphadenopathy.  Lungs: Non-labored, and without evidence of stridor.  Cardiac: Pulses  are strong, well-perfused.  Extremities: Without gross evidence of clubbing, cyanosis, or edema.  Neuro: Cranial nerves II-XII grossly intact; Intact facial movements.    Procedure:  Rigid nasal endoscopy ([id number])  Pre-procedure diagnosis/Indication for procedure:  To evaluate areas not visualized on anterior rhinoscopy   Anesthesia:  None  Description:  A 0-degree 3-mm rigid nasal endoscope was used to examine the left and right nasal cavities.  The nasal valve areas were examined for abnormalities or collapse.  The inferior and middle turbinates were evaluated.  The middle and superior meatuses, and the sphenoethmoid recesses were examined and inspected for mucopurulence and polyps. Once the endoscope was withdrawn, the patient was noted to have tolerated the procedure well without complications and was returned to ambulatory status.    Findings:    Well-healed sinonasal cavity.  No drainage from the middle meatus or sphenoethmoidal recess.  No crusting.  No polyps or lesions.  The nasopharynx is clear.      Assessment:     Gary Contreras is a 69 y.o. male with chronic facial pain pressure and discomfort.  Status post endoscopic sinus surgery for left-sided sphenoid sinus fungal ball.    Plan:      Gary is healed well after surgery but unfortunately he continues to have chronic facial pain and pressure.  At this point I think that his symptoms are not likely to be related to the sphenoid sinus fungal ball and I would recommend that he be evaluated by neurology for consideration of possible atypical facial pain syndromes or other associated disorders.  Will have him follow-up with me after neurology.      Ryan Adams MD, M.Eng.   of Otolaryngology - Head & Neck Surgery  Division of Rhinology and Endoscopic Skull Base Surgery  [facility]

## 2024-10-11 ENCOUNTER — HOSPITAL ENCOUNTER (OUTPATIENT)
Dept: RADIOLOGY | Facility: CLINIC | Age: 69
Discharge: HOME | End: 2024-10-11
Payer: MEDICARE

## 2024-10-11 DIAGNOSIS — G50.1 ATYPICAL FACE PAIN: ICD-10-CM

## 2024-10-11 PROCEDURE — A9575 INJ GADOTERATE MEGLUMI 0.1ML: HCPCS | Performed by: OTOLARYNGOLOGY

## 2024-10-11 PROCEDURE — 2550000001 HC RX 255 CONTRASTS: Performed by: OTOLARYNGOLOGY

## 2024-10-11 PROCEDURE — 70553 MRI BRAIN STEM W/O & W/DYE: CPT

## 2024-10-11 RX ORDER — GADOTERATE MEGLUMINE 376.9 MG/ML
0.2 INJECTION INTRAVENOUS
Status: COMPLETED | OUTPATIENT
Start: 2024-10-11 | End: 2024-10-11

## 2024-10-14 DIAGNOSIS — J32.9 SINUSITIS, UNSPECIFIED CHRONICITY, UNSPECIFIED LOCATION: ICD-10-CM

## 2024-10-14 DIAGNOSIS — T78.40XA ALLERGY, INITIAL ENCOUNTER: ICD-10-CM

## 2024-10-15 DIAGNOSIS — T78.40XA ALLERGY, INITIAL ENCOUNTER: ICD-10-CM

## 2024-10-15 DIAGNOSIS — J32.9 SINUSITIS, UNSPECIFIED CHRONICITY, UNSPECIFIED LOCATION: ICD-10-CM

## 2024-10-15 RX ORDER — FLUTICASONE PROPIONATE 50 MCG
SPRAY, SUSPENSION (ML) NASAL
Qty: 48 ML | Refills: 2 | Status: SHIPPED | OUTPATIENT
Start: 2024-10-15 | End: 2024-10-15

## 2024-10-15 RX ORDER — FLUTICASONE PROPIONATE 50 MCG
SPRAY, SUSPENSION (ML) NASAL
Qty: 48 ML | Refills: 2 | Status: SHIPPED | OUTPATIENT
Start: 2024-10-15

## 2024-10-23 ASSESSMENT — ENCOUNTER SYMPTOMS
ACTIVITY CHANGE: 0
WHEEZING: 1
SHORTNESS OF BREATH: 1
NAUSEA: 0
EYE PAIN: 1
SINUS PAIN: 1
PALPITATIONS: 0
TROUBLE SWALLOWING: 1
ADENOPATHY: 0
DIAPHORESIS: 0
RHINORRHEA: 1
VOICE CHANGE: 1
APPETITE CHANGE: 0
COUGH: 0
CHEST TIGHTNESS: 0
UNEXPECTED WEIGHT CHANGE: 0
SINUS PRESSURE: 1
CHILLS: 0
FEVER: 0

## 2024-10-23 NOTE — PROGRESS NOTES
Subjective   Patient ID: Gary Contreras is a 69 y.o. male who presents for COPD (Follow up ).  HPI  Mr. Contreras is a 69M PMH allergies, sinusitis, HLD, hearing loss, GERD, who is referred to pulmonary for COPD and lung nodule.     Last visit 6/20/24: LABA/LAMA started continue albuterol, TTE in future if dyspnea not improving    #Dyspnea  #Moderate COPD  He has stopped taking his medications but can tell a difference after and is thinking about going back to them.   He is going to get nasal spray.  ENT performed endoscopic sinus fungal ball. 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 He notices lots of eye pain.   He thinks he used the stiolto but isn't really sure. He wasn't familiar with it and I think he did not pick it up.   We discussed alpha 1 antitrypsin deficiency testing.    Not exercising at this time.     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   HENT:  Positive for postnasal drip, rhinorrhea, sinus pressure, sinus pain, sneezing, tinnitus, trouble swallowing and voice change. Negative for congestion and hearing loss.         Describes more neck tightness than trouble swallowing  Voice cracks per patient,   Eyes:  Positive for pain.   Respiratory:  Positive for shortness of breath and wheezing. Negative for cough and chest tightness.         Wheezing in AM   Cardiovascular:  Negative for chest pain, palpitations and leg swelling.   Gastrointestinal:  Negative for nausea.   Skin:  Negative for rash.        Subjective redness to chest   Allergic/Immunologic: Positive for environmental allergies.   Hematological:  Negative for  adenopathy.       Objective   Physical Exam  Vitals reviewed.   Constitutional:       Appearance: Normal appearance.   HENT:      Head: Normocephalic and atraumatic.      Nose: No rhinorrhea.      Mouth/Throat:      Mouth: Mucous membranes are moist.      Pharynx: No posterior oropharyngeal erythema.   Eyes:      Extraocular Movements: Extraocular movements intact.   Cardiovascular:      Rate and Rhythm: Normal rate and regular rhythm.      Heart sounds: No murmur heard.  Pulmonary:      Effort: Pulmonary effort is normal.      Breath sounds: No wheezing or rhonchi.   Abdominal:      Palpations: Abdomen is soft.   Musculoskeletal:      Cervical back: Neck supple. No tenderness.      Right lower leg: No edema.      Left lower leg: No edema.   Lymphadenopathy:      Cervical: No cervical adenopathy.   Skin:     General: Skin is warm and dry.      Comments: Mild blanching erythema on chest   Neurological:      General: No focal deficit present.      Mental Status: He is alert and oriented to person, place, and time.   Psychiatric:      Comments: anxious       TTE not done    PFT 5/20/24  FVC 4.45 99%, FEV1 2.18 64%, no BD response  TLC 8.94 120%, gas trapping present  DLCO 22.14 79%  Moderate obstruction, + BD response, hyperinflation and air trapping present, DLCO normal    CT chest 4/22/24  IMPRESSION:  1. Resolution of previously new scattered areas of mild bronchiolitis  in the right lung. Underlying smoking-related lung disease and stable  small nodular densities which are likely benign. Advise attention on  ongoing annual low-dose lung cancer screening CT.    CT chest 1/10/24  FINDINGS:  LUNGS AND AIRWAYS:  The trachea and central airways are patent. No endobronchial lesion  is seen.  There is mild bilateral upper lung predominant centrilobular and  paraseptal emphysema.There is no focal consolidation, pleural  effusion, or pneumothorax.  Multiple areas of ground-glass opacity and tree-in-bud nodularity in  the  right upper and right middle lobe, new compared to prior study.  3 mm right lower lobe nodule, image 134/379, stable.  4 mm right lower lobe nodule, image 240/379, stable.  5 mm right lower lobe nodule, image 269/379, stable.  MEDIASTINUM AND GENI, LOWER NECK AND AXILLA:  The visualized thyroid gland is within normal limits.  No evidence of thoracic lymphadenopathy by CT criteria.  Esophagus appears within normal limits as seen.  HEART AND VESSELS:  Ectatic ascending thoracic aorta measuring 3.9 cm.There is mild  scattered calcified atherosclerosis present. Main pulmonary artery  and its branches are normal in caliber. Mild coronary artery  calcifications are seen. Please note,the study is not optimized for  evaluation of coronary arteries. The cardiac chambers are not  enlarged. There is no pericardial effusion seen.  UPPER ABDOMEN:  1.5 cm hypodense lesion in the right kidney, likely cyst  CHEST WALL AND OSSEOUS STRUCTURES:  Chest wall is within normal limits.  No acute osseous pathology.There are no suspicious osseous lesions.      IMPRESSION:  1. Multiple areas of ground-glass opacity and tree-in-bud nodularity  predominantly in the right upper and right middle lobe, likely  inflammatory/infectious in etiology. Recommend short-term follow-up  chest CT in 3 months to confirm resolution and establish a new  baseline.  2. Scattered predominantly right-sided pulmonary nodules measuring to 5 mm, stable and as described above.  3. Mild upper lung predominant emphysema.  4. Mild coronary artery calcification.  5. Other findings as described above    CT sinus 1/10/2024  IMPRESSION:  *Polypoid mucosal thickening left sphenoid sinus with mineralized  inspissated mucus secretions *Previous fracture right medial orbital  wall without muscle entrapment *Likely postoperative bony hypertrophy  in the right alveolar recess secondary to dental implant versus  osteoma    Assessment/Plan   Diagnoses and all orders for this  visit:  Chronic obstructive pulmonary disease, unspecified COPD type (Multi)  -     Alpha-1-Antitrypsin; Future  -     Alpha-1 Antitrypsin Phenotype; Future  -     umeclidinium-vilanteroL (Anoro Ellipta) 62.5-25 mcg/actuation blister with device; Inhale 1 puff once daily.  Former tobacco use  -     CT lung screening follow up CT chest wo IV contrast; Future          Mr. Contreras is a 69M PMH allergies, sinusitis, HLD, hearing loss, GERD, who is referred to pulmonary for COPD and lung nodule.     #Lung nodule  #Former tobacco user  Repeat CT chest 4/2025 unless further symptoms require addressing prior to that date. Order placed today and advised patient to schedule.    #COPD  #Dyspnea on exertion  #Cough  Patient resistant to adding medications at this time. He is very anxious about multiple medical disorders and doesn't seem to continue medications if he doesn't feel a quick response to treatment.   Advised can try anoro ellipta based on cost if stiolto not filled at this time.   Patient advised to increase exercise and obtain regularly recommended respiratory vaccines.   Alpha 1 antitrypsin testing ordered.     #Sinusitis  Continue to follow with ENT    RTC 4-6 months     Guerita Rodrigues MD 10/25/24 12:50 PM

## 2024-10-25 ENCOUNTER — APPOINTMENT (OUTPATIENT)
Dept: PULMONOLOGY | Facility: CLINIC | Age: 69
End: 2024-10-25
Payer: MEDICARE

## 2024-10-25 VITALS
SYSTOLIC BLOOD PRESSURE: 138 MMHG | HEIGHT: 72 IN | HEART RATE: 60 BPM | OXYGEN SATURATION: 98 % | BODY MASS INDEX: 27.09 KG/M2 | WEIGHT: 200 LBS | DIASTOLIC BLOOD PRESSURE: 88 MMHG | TEMPERATURE: 97 F

## 2024-10-25 DIAGNOSIS — Z87.891 FORMER TOBACCO USE: ICD-10-CM

## 2024-10-25 DIAGNOSIS — J44.9 CHRONIC OBSTRUCTIVE PULMONARY DISEASE, UNSPECIFIED COPD TYPE (MULTI): Primary | ICD-10-CM

## 2024-10-25 PROCEDURE — 99214 OFFICE O/P EST MOD 30 MIN: CPT | Performed by: INTERNAL MEDICINE

## 2024-10-25 PROCEDURE — 1125F AMNT PAIN NOTED PAIN PRSNT: CPT | Performed by: INTERNAL MEDICINE

## 2024-10-25 PROCEDURE — 1159F MED LIST DOCD IN RCRD: CPT | Performed by: INTERNAL MEDICINE

## 2024-10-25 PROCEDURE — 1036F TOBACCO NON-USER: CPT | Performed by: INTERNAL MEDICINE

## 2024-10-25 PROCEDURE — 3008F BODY MASS INDEX DOCD: CPT | Performed by: INTERNAL MEDICINE

## 2024-10-25 RX ORDER — UMECLIDINIUM BROMIDE AND VILANTEROL TRIFENATATE 62.5; 25 UG/1; UG/1
1 POWDER RESPIRATORY (INHALATION) DAILY
Qty: 30 EACH | Refills: 3 | Status: SHIPPED | OUTPATIENT
Start: 2024-10-25 | End: 2025-02-22

## 2024-10-25 ASSESSMENT — COPD QUESTIONNAIRES
QUESTION2_CHESTPHLEGM: 3
QUESTION7_SLEEPQUALITY: 3
QUESTION8_ENERGYLEVEL: 2
QUESTION6_LEAVINGHOUSE: 0 - I AM CONFIDENT LEAVING MY HOME DESPITE MY LUNG CONDITION
QUESTION1_COUGHFREQUENCY: 0 - I NEVER COUGH
QUESTION3_CHESTTIGHTNESS: 3
QUESTION5_HOMEACTIVITIES: 0 - I AM NOT LIMITED DOING ANY ACTIVITIES AT HOME
CAT_TOTALSCORE: 14
QUESTION4_WALKINCLINE: 3

## 2024-10-25 ASSESSMENT — ENCOUNTER SYMPTOMS
LOSS OF SENSATION IN FEET: 0
DEPRESSION: 0
OCCASIONAL FEELINGS OF UNSTEADINESS: 0

## 2024-10-25 ASSESSMENT — PAIN SCALES - GENERAL: PAINLEVEL_OUTOF10: 8

## 2024-10-25 NOTE — PATIENT INSTRUCTIONS
Thank you for coming in today! Please call with questions or concerns.    I am glad you are doing well.  I think you should add back in your sinus medications and your inhalers for the shortness of breath.     Please schedule:  - PCP appointment  - Repeat ENT if sinus issues continue  - Try adding in another neurology appointment at a different location if closer  - Repeat CAT scan 4/2025    Please start:  - Anoro ellipta - ask the pharmacist if there are any rebates, coupon cards    Bloodwork for COPD ordered if you are interested    Return to clinic in 4-6 months    Call [phone] to schedule tests

## 2024-11-13 ENCOUNTER — TELEPHONE (OUTPATIENT)
Facility: CLINIC | Age: 69
End: 2024-11-13
Payer: MEDICARE

## 2024-11-13 NOTE — TELEPHONE ENCOUNTER
Patient made aware of below MR Brain results. Per Dr. Adams, MRI was within normal limits and nothing that jumps out that would necessarily cause his discomfort. Patient states he has an upcoming neurology appointment in March 2024, which was the earliest appointment available. RN added patient to neurology wait list to hopefully get a sooner appointment if there is a cancellation. Patient verbalizes understanding and will attend his neurology appointment.     MR brain w and wo IV contrast  Status: Final result     PACS Images     Show images for MR brain w and wo IV contrast  Signed by    Signed Time Phone Pager   Lance Smith MD 10/11/2024 11:20 [phone]     Exam Information    Status Exam Begun Exam Ended   Final 10/11/2024 09:45 10/11/2024 10:12     Study Result    Narrative & Impression   Interpreted By:  Lance Smith,   STUDY:  MR BRAIN W AND WO IV CONTRAST;  10/11/2024 10:12 am      INDICATION:  Signs/Symptoms:chronic headache/facial pain, history of sphenoid  mycetoma.      ,G50.1 Atypical facial pain      COMPARISON:  CT head dated 01/10/2024.      ACCESSION NUMBER(S):  [id number]      ORDERING CLINICIAN:  KAYODE ADAMS      TECHNIQUE:  Standard multiplanar multisequence MR imaging was performed through  the brain prior to and following administration of 18 mL Dotarem  intravenous contrast.      FINDINGS:  Parenchyma: There is no diffusion restriction abnormality to suggest  acute infarct.  No evidence of recent hemorrhage. There is a GRE  hypointense focus within the left parietal lobe (series 3, image 20)  that may represent a chronic microhemorrhage or focal mineralization.  There is no mass effect or midline shift. No abnormal parenchymal or  leptomeningeal enhancement. Trace chronic small vessel ischemic  disease suggested.      CSF Spaces: The ventricles, sulci and basal cisterns are within  normal limits for age with mild-to-moderate generalized brain  atrophy. Basilar  cisterns are patent.      Extra-axial spaces: No extra-axial fluid collection.      Intracranial Flow Voids: Patent appearing.      Paranasal Sinuses: Visualized paranasal sinuses are clear.      Mastoids: Trace fluid bilaterally.      Orbits: Normal.      Calvarium: No suspicious osseous marrow signal.      IMPRESSION:  No acute infarct, recent hemorrhage, or intracranial mass effect. No  abnormal parenchymal enhancement.      Trace chronic small vessel ischemic disease with mild-to-moderate  generalized brain atrophy.      Paranasal sinuses are well aerated.      MACRO:  None      Signed by: Lance Smith 10/11/2024 11:20 AM

## 2024-12-05 DIAGNOSIS — K21.9 GASTRO-ESOPHAGEAL REFLUX DISEASE WITHOUT ESOPHAGITIS: ICD-10-CM

## 2024-12-05 RX ORDER — PANTOPRAZOLE SODIUM 40 MG/1
TABLET, DELAYED RELEASE ORAL
Qty: 30 TABLET | Refills: 0 | Status: SHIPPED | OUTPATIENT
Start: 2024-12-05

## 2024-12-10 PROBLEM — Z00.00 MEDICARE ANNUAL WELLNESS VISIT, INITIAL: Status: ACTIVE | Noted: 2024-12-10

## 2024-12-10 NOTE — PROGRESS NOTES
Subjective   Patient ID: Gary Contreras is a 69 y.o. male who presents for Welcome To Medicare.  HPI  Welcome Medicare Vist    Eats a generally healthy diet   Exercises   Denies any chest pain,SOB  Last eye apt 10 years  Last dental apt 6 month     Pt follows up with Dr. Rodrigues for COPD  Has been prescribed Anoro Ellipta for SOB  Was also advised to resume sinus medications    Pt follows up with ENT for sinusitis  He underwent CT Head on 1/4/24 as ordered, it was unremarkable  Underwent endoscopic sphenoid sinusotomy which was consistent with mycetoma   Symptoms such as chronic facial pain and pressure have not improved significantly  Dr. Adams recommended neurology follow-up    GERD  Taking pantoprazole 40 mg every day     2 Brothers - prostate cancer  We discussed that if his PSA is within the normal limit, his risk of prostate cancer will likely be low.     Pt reports constant eye pain    Pt has itchy and dry skin on his chest  Denies any new lotion or shampoo      Advanced Care Planning  Gary Contreras and I discussed their advance care plan preferences such as living will, and durable health care power of , which include: no Attempt Resuscitation, yes Intubate & Ventilate, yes Comfort Care or Life- Sustaning Care. I reminded patient to talk with their health care agent, no one , about their health care goals. Note reflects patient's free will and care goals as expressed to me.     No other concern/question   Review of systems  ; Patient seen today for exam denies any problems with headaches or vision, denies any shortness of breath chest pain nausea or vomiting, no black stool no blood in the stool no heartburn type symptoms denies any problems with constipation or diarrhea, and no dysuria-type symptoms    The patient's allergies medications were reviewed with them today    The patient's social family and surgical history or also reviewed here today, along with her past medical history.      Objective     Alert and active in  no acute distress  HEENT TMs clear oropharynx negative nares clear no drainage noted neck supple  With no adenopathy   Heart regular rate and rhythm without murmur and no carotid bruits  Lungs- clear to auscultation bilaterally, no wheeze or rhonchi noted  Thyroid -negative masses or nodularity  Abdomen- soft times four quadrants , bowel sounds positive no masses or organomegaly, negative tenderness guarding or rebound  Neurological exam unremarkable- DTRs in upper and lower extremities within normal limits.   skin -fine papules on his chest consistent with dermatitis the eczema type      /74 (BP Location: Left arm, Patient Position: Sitting, BP Cuff Size: Adult)   Pulse 86   Temp 36.2 °C (97.2 °F) (Temporal)   Resp 16   Ht 1.829 m (6')   Wt 88.9 kg (196 lb)   SpO2 95%   BMI 26.58 kg/m²     No Known Allergies    Assessment/Plan   Problem List Items Addressed This Visit       Dyslipidemia    Relevant Orders    Lipid Panel (Completed)    Fatigue    Relevant Orders    Comprehensive Metabolic Panel (Completed)    CBC and Auto Differential (Completed)    BMI 26.0-26.9,adult    Prostate cancer screening - Primary    Relevant Orders    Prostate Specific Antigen, Screen (Completed)     Other Visit Diagnoses       Gastro-esophageal reflux disease without esophagitis        Relevant Medications    pantoprazole (ProtoNix) 40 mg EC tablet    Dermatitis              Medicare wellness questionnaire reviewed in detail. Advanced Directives reviewed today. Patient advised to provide the office with a copy if has not already done so. No problems with activities of daily living.  Falls risks reviewed.     Labs have been ordered, he will have these performed and we will contact him with results.  (CBC, CMP, Lipid, PSA)     Reviewed CT Head - 1/4/24, normal    Refilled pantoprazole     Pt has thick chest hair. We recommended using Head&Shoulder shampoo and rub it on his chest for  resolving ongoing dermatitis.     If anything worsens or changes please call us at once, follow up in the office as planned,       Scribe Attestation  By signing my name below, I, Danielle Quach, Scribglenna   attest that this documentation has been prepared under the direction and in the presence of Tutu Fay DO.

## 2024-12-11 ENCOUNTER — APPOINTMENT (OUTPATIENT)
Dept: PRIMARY CARE | Facility: CLINIC | Age: 69
End: 2024-12-11
Payer: MEDICARE

## 2024-12-11 ENCOUNTER — LAB (OUTPATIENT)
Dept: LAB | Facility: LAB | Age: 69
End: 2024-12-11
Payer: MEDICARE

## 2024-12-11 VITALS
TEMPERATURE: 97.2 F | DIASTOLIC BLOOD PRESSURE: 74 MMHG | OXYGEN SATURATION: 95 % | HEIGHT: 72 IN | SYSTOLIC BLOOD PRESSURE: 110 MMHG | WEIGHT: 196 LBS | HEART RATE: 86 BPM | BODY MASS INDEX: 26.55 KG/M2 | RESPIRATION RATE: 16 BRPM

## 2024-12-11 DIAGNOSIS — L30.9 DERMATITIS: ICD-10-CM

## 2024-12-11 DIAGNOSIS — E78.5 DYSLIPIDEMIA: ICD-10-CM

## 2024-12-11 DIAGNOSIS — Z00.00 MEDICARE ANNUAL WELLNESS VISIT, INITIAL: Primary | ICD-10-CM

## 2024-12-11 DIAGNOSIS — R53.83 FATIGUE, UNSPECIFIED TYPE: ICD-10-CM

## 2024-12-11 DIAGNOSIS — Z12.5 PROSTATE CANCER SCREENING: ICD-10-CM

## 2024-12-11 DIAGNOSIS — K21.9 GASTRO-ESOPHAGEAL REFLUX DISEASE WITHOUT ESOPHAGITIS: ICD-10-CM

## 2024-12-11 LAB
ALBUMIN SERPL BCP-MCNC: 4.7 G/DL (ref 3.4–5)
ALP SERPL-CCNC: 44 U/L (ref 33–136)
ALT SERPL W P-5'-P-CCNC: 9 U/L (ref 10–52)
ANION GAP SERPL CALC-SCNC: 12 MMOL/L (ref 10–20)
AST SERPL W P-5'-P-CCNC: 13 U/L (ref 9–39)
BASOPHILS # BLD AUTO: 0.08 X10*3/UL (ref 0–0.1)
BASOPHILS NFR BLD AUTO: 1 %
BILIRUB SERPL-MCNC: 1.6 MG/DL (ref 0–1.2)
BUN SERPL-MCNC: 13 MG/DL (ref 6–23)
CALCIUM SERPL-MCNC: 9.5 MG/DL (ref 8.6–10.3)
CHLORIDE SERPL-SCNC: 106 MMOL/L (ref 98–107)
CHOLEST SERPL-MCNC: 207 MG/DL (ref 0–199)
CHOLESTEROL/HDL RATIO: 4
CO2 SERPL-SCNC: 27 MMOL/L (ref 21–32)
CREAT SERPL-MCNC: 1.06 MG/DL (ref 0.5–1.3)
EGFRCR SERPLBLD CKD-EPI 2021: 77 ML/MIN/1.73M*2
EOSINOPHIL # BLD AUTO: 0.11 X10*3/UL (ref 0–0.7)
EOSINOPHIL NFR BLD AUTO: 1.3 %
ERYTHROCYTE [DISTWIDTH] IN BLOOD BY AUTOMATED COUNT: 12.9 % (ref 11.5–14.5)
GLUCOSE SERPL-MCNC: 99 MG/DL (ref 74–99)
HCT VFR BLD AUTO: 42.6 % (ref 41–52)
HDLC SERPL-MCNC: 52 MG/DL
HGB BLD-MCNC: 14.3 G/DL (ref 13.5–17.5)
IMM GRANULOCYTES # BLD AUTO: 0.03 X10*3/UL (ref 0–0.7)
IMM GRANULOCYTES NFR BLD AUTO: 0.4 % (ref 0–0.9)
LDLC SERPL CALC-MCNC: 130 MG/DL
LYMPHOCYTES # BLD AUTO: 1.84 X10*3/UL (ref 1.2–4.8)
LYMPHOCYTES NFR BLD AUTO: 22 %
MCH RBC QN AUTO: 30.2 PG (ref 26–34)
MCHC RBC AUTO-ENTMCNC: 33.6 G/DL (ref 32–36)
MCV RBC AUTO: 90 FL (ref 80–100)
MONOCYTES # BLD AUTO: 0.76 X10*3/UL (ref 0.1–1)
MONOCYTES NFR BLD AUTO: 9.1 %
NEUTROPHILS # BLD AUTO: 5.54 X10*3/UL (ref 1.2–7.7)
NEUTROPHILS NFR BLD AUTO: 66.2 %
NON HDL CHOLESTEROL: 155 MG/DL (ref 0–149)
NRBC BLD-RTO: 0 /100 WBCS (ref 0–0)
PLATELET # BLD AUTO: 266 X10*3/UL (ref 150–450)
POTASSIUM SERPL-SCNC: 4.3 MMOL/L (ref 3.5–5.3)
PROT SERPL-MCNC: 7.2 G/DL (ref 6.4–8.2)
PSA SERPL-MCNC: 1.21 NG/ML
RBC # BLD AUTO: 4.73 X10*6/UL (ref 4.5–5.9)
SODIUM SERPL-SCNC: 141 MMOL/L (ref 136–145)
TRIGL SERPL-MCNC: 125 MG/DL (ref 0–149)
VLDL: 25 MG/DL (ref 0–40)
WBC # BLD AUTO: 8.4 X10*3/UL (ref 4.4–11.3)

## 2024-12-11 PROCEDURE — 80061 LIPID PANEL: CPT

## 2024-12-11 PROCEDURE — 99213 OFFICE O/P EST LOW 20 MIN: CPT | Performed by: FAMILY MEDICINE

## 2024-12-11 PROCEDURE — 1170F FXNL STATUS ASSESSED: CPT | Performed by: FAMILY MEDICINE

## 2024-12-11 PROCEDURE — 1157F ADVNC CARE PLAN IN RCRD: CPT | Performed by: FAMILY MEDICINE

## 2024-12-11 PROCEDURE — 1036F TOBACCO NON-USER: CPT | Performed by: FAMILY MEDICINE

## 2024-12-11 PROCEDURE — G0438 PPPS, INITIAL VISIT: HCPCS | Performed by: FAMILY MEDICINE

## 2024-12-11 PROCEDURE — 85025 COMPLETE CBC W/AUTO DIFF WBC: CPT

## 2024-12-11 PROCEDURE — 80053 COMPREHEN METABOLIC PANEL: CPT

## 2024-12-11 PROCEDURE — 36415 COLL VENOUS BLD VENIPUNCTURE: CPT

## 2024-12-11 PROCEDURE — 1159F MED LIST DOCD IN RCRD: CPT | Performed by: FAMILY MEDICINE

## 2024-12-11 PROCEDURE — 1160F RVW MEDS BY RX/DR IN RCRD: CPT | Performed by: FAMILY MEDICINE

## 2024-12-11 PROCEDURE — 3008F BODY MASS INDEX DOCD: CPT | Performed by: FAMILY MEDICINE

## 2024-12-11 PROCEDURE — G0103 PSA SCREENING: HCPCS

## 2024-12-11 RX ORDER — PANTOPRAZOLE SODIUM 40 MG/1
40 TABLET, DELAYED RELEASE ORAL DAILY
Qty: 90 TABLET | Refills: 1 | Status: SHIPPED | OUTPATIENT
Start: 2024-12-11

## 2024-12-11 ASSESSMENT — ACTIVITIES OF DAILY LIVING (ADL)
GROCERY_SHOPPING: INDEPENDENT
DRESSING: INDEPENDENT
DOING_HOUSEWORK: INDEPENDENT
BATHING: INDEPENDENT
TAKING_MEDICATION: INDEPENDENT
MANAGING_FINANCES: INDEPENDENT

## 2024-12-12 ENCOUNTER — TELEPHONE (OUTPATIENT)
Dept: PRIMARY CARE | Facility: CLINIC | Age: 69
End: 2024-12-12
Payer: MEDICARE

## 2024-12-12 NOTE — TELEPHONE ENCOUNTER
----- Message from Tutu Fay sent at 12/11/2024  8:15 PM EST -----  The labs reviewed were all normal, except the cholesterol was elevated, we recommend less beef, less fried foods,, and more fruits and vegetables and fish in the diet, recheck in 6 months

## 2025-03-06 ENCOUNTER — APPOINTMENT (OUTPATIENT)
Dept: NEUROLOGY | Facility: CLINIC | Age: 70
End: 2025-03-06
Payer: MEDICARE

## 2025-03-06 VITALS — HEIGHT: 72 IN | WEIGHT: 198 LBS | BODY MASS INDEX: 26.82 KG/M2

## 2025-03-06 DIAGNOSIS — G50.1 ATYPICAL FACE PAIN: ICD-10-CM

## 2025-03-06 PROCEDURE — 3008F BODY MASS INDEX DOCD: CPT | Performed by: PSYCHIATRY & NEUROLOGY

## 2025-03-06 PROCEDURE — 1157F ADVNC CARE PLAN IN RCRD: CPT | Performed by: PSYCHIATRY & NEUROLOGY

## 2025-03-06 PROCEDURE — 99204 OFFICE O/P NEW MOD 45 MIN: CPT | Performed by: PSYCHIATRY & NEUROLOGY

## 2025-03-06 PROCEDURE — G2211 COMPLEX E/M VISIT ADD ON: HCPCS | Performed by: PSYCHIATRY & NEUROLOGY

## 2025-03-06 PROCEDURE — 1036F TOBACCO NON-USER: CPT | Performed by: PSYCHIATRY & NEUROLOGY

## 2025-03-06 RX ORDER — GABAPENTIN 300 MG/1
300 CAPSULE ORAL NIGHTLY
Qty: 30 CAPSULE | Refills: 3 | Status: SHIPPED | OUTPATIENT
Start: 2025-03-06

## 2025-03-06 NOTE — PROGRESS NOTES
Consulting Physician: Dr. Adams    Chief Complaint: Atypical Facial Pain    History Of Present Illness  Gary Contreras is a 69 y.o. male presenting with above.    For about 1.5 years, the patient has noted facial pain.  He describes an achy numbing sensation in his cheek and forehead area.  The pain is constant.  He has no associated photophobia, phonophobia, or nausea.  He notes bilateral tinnitus characterized by a high pitched ringing in the ears.  He denies any visual changes.  Specifically, he denies any double vision, loss of peripheral vision, or blurry vision.  He also denies any slurred speech, facial droop, weakness, or numbness.  He does note feeling tired all the time.  He does snore at night.  He was diagnosed with ELIZA 25 years ago.  He did have a sinus surgery - no improvement in his symptoms.         Past Medical History  Past Medical History:   Diagnosis Date    COPD (chronic obstructive pulmonary disease) (Multi)     Personal history of other diseases of the respiratory system 08/26/2019    History of sinusitis    Sleep apnea     Per patient he was diagnosed 20 years ago after his sleep study and was never able to tolerate a CPAP machine       Surgical History  Past Surgical History:   Procedure Laterality Date    COLONOSCOPY  2007       Family History  Family History   Problem Relation Name Age of Onset    Prostate cancer Brother          Social History   reports that he quit smoking about 3 years ago. His smoking use included cigarettes. He has quit using smokeless tobacco. He reports current alcohol use. He reports that he does not use drugs.     Allergies  Patient has no known allergies.    Medications    Current Outpatient Medications:     albuterol 90 mcg/actuation inhaler, Inhale 2 puffs every 6 hours if needed for wheezing. (Patient not taking: Reported on 10/1/2024), Disp: 18 g, Rfl: 11    azelastine (Astelin) 137 mcg (0.1 %) nasal spray, Administer 2 sprays into each nostril 2 times  a day. (Patient not taking: Reported on 10/1/2024), Disp: 90 mL, Rfl: 3    cetirizine (ZyrTEC) 10 mg tablet, Take 1 tablet (10 mg) by mouth once daily. (Patient not taking: Reported on 5/14/2024), Disp: 30 tablet, Rfl: 11    clotrimazole-betamethasone (Lotrisone) cream, Apply twice daily to outer lower lip for cheilitis (Patient not taking: Reported on 5/14/2024), Disp: 15 g, Rfl: 1    fluticasone (Flonase) 50 mcg/actuation nasal spray, PLEASE SEE ATTACHED FOR DETAILED DIRECTIONS (Patient not taking: Reported on 12/11/2024), Disp: 48 mL, Rfl: 2    mometasone furoate, bulk, 100 % powder, Compound 1 mg capsule to be added to sinus rinse twice daily. (Patient not taking: Reported on 5/14/2024), Disp: 180 g, Rfl: 0    pantoprazole (ProtoNix) 40 mg EC tablet, Take 1 tablet (40 mg) by mouth once daily. Do not crush, chew, or split., Disp: 90 tablet, Rfl: 1    umeclidinium-vilanteroL (Anoro Ellipta) 62.5-25 mcg/actuation blister with device, Inhale 1 puff once daily. (Patient not taking: Reported on 12/11/2024), Disp: 30 each, Rfl: 3      Last Recorded Vitals   Height 1.829 m (6'), weight 89.8 kg (198 lb).    Objective:  Gen: NAD  Neuro:  --HIF: A&O X 3, repetition and naming intact  --CN:  PERRLA, EOMI, VFF, no visible facial asymmetry, facial sensation intact, no tongue or palatal deviation, SCM intact  --Motor: Moves all 4 extremities equally; no focal deficits  --Sensory: Intact to light touch, intact to pinprick  --Reflex: 2+ symmetric, toes down  --Cerebellum: FTN and HTS intact  --Gait: Normal, narrow based.  Toe and Heal Walking Intact.  Tandem Intact    Relevant Results  Lab Results   Component Value Date    WBC 8.4 12/11/2024    HGB 14.3 12/11/2024    HCT 42.6 12/11/2024    MCV 90 12/11/2024     12/11/2024       Lab Results   Component Value Date    GLUCOSE 99 12/11/2024    CALCIUM 9.5 12/11/2024     12/11/2024    K 4.3 12/11/2024    CO2 27 12/11/2024     12/11/2024    BUN 13 12/11/2024     "CREATININE 1.06 12/11/2024       No results found for: \"HGBA1C\"    Lab Results   Component Value Date    CHOL 207 (H) 12/11/2024    CHOL 193 08/03/2023    CHOL 185 03/03/2022     Lab Results   Component Value Date    HDL 52.0 12/11/2024    HDL 53.7 08/03/2023    HDL 48.0 03/03/2022     Lab Results   Component Value Date    LDLCALC 130 (H) 12/11/2024     Lab Results   Component Value Date    TRIG 125 12/11/2024    TRIG 109 08/03/2023    TRIG 107 03/03/2022     No components found for: \"CHOLHDL\"    MRI Brain with and without contrast (I personally reviewed the images/tracings with the following interpretation)  No evidence of stroke, mass lesion, etc     Assessment:  This is a 69 year old male presenting for evaluation of facial pain.  For the past 1.5 years, he has noted a achy pain in his face (forehead and cheeks bilaterally).  The pain is constant.  He does note snoring and excessive daytime sleepiness.  He was diagnosed with ELIZA 25 years ago - and declined treatment.  He did undergo sinus surgery which did not help his smptoms.    On exam, he has no focal deficits.  I reviewed his MRI Brain which was unremarkable.    Other considerations for his symptoms include untreated ELIZA - he declines further workup for this.  Tension Headaches are another possibility.    Will do a trial of Gabapentin 300 mg at bedtime for headache prevention (reviewed side effects)  Follow up in 3 months        Burak Bellamy MD  [facility]  Department of Neurology      A copy of this note was sent to the referring provider.    "

## 2025-04-21 ENCOUNTER — HOSPITAL ENCOUNTER (OUTPATIENT)
Dept: RADIOLOGY | Facility: CLINIC | Age: 70
Discharge: HOME | End: 2025-04-21
Payer: MEDICARE

## 2025-04-21 DIAGNOSIS — Z87.891 FORMER TOBACCO USE: ICD-10-CM

## 2025-04-21 PROCEDURE — 76380 CAT SCAN FOLLOW-UP STUDY: CPT | Performed by: RADIOLOGY

## 2025-04-21 PROCEDURE — 71250 CT THORAX DX C-: CPT

## 2025-04-22 ENCOUNTER — TELEPHONE (OUTPATIENT)
Dept: PULMONOLOGY | Facility: HOSPITAL | Age: 70
End: 2025-04-22
Payer: MEDICARE

## 2025-04-22 DIAGNOSIS — F17.200 TOBACCO USE DISORDER: Primary | ICD-10-CM

## 2025-04-23 ENCOUNTER — HOSPITAL ENCOUNTER (OUTPATIENT)
Dept: RADIOLOGY | Facility: EXTERNAL LOCATION | Age: 70
Discharge: HOME | End: 2025-04-23

## 2025-04-23 ASSESSMENT — ENCOUNTER SYMPTOMS
CHEST TIGHTNESS: 0
COUGH: 0
SINUS PRESSURE: 1
PALPITATIONS: 0
ACTIVITY CHANGE: 0
CHILLS: 0
DIAPHORESIS: 0
NAUSEA: 0
FEVER: 0
APPETITE CHANGE: 0
EYE PAIN: 1
ADENOPATHY: 0
UNEXPECTED WEIGHT CHANGE: 0
SINUS PAIN: 1

## 2025-04-23 NOTE — PROGRESS NOTES
Subjective   Patient ID: Gary Contreras is a 69 y.o. male who presents for COPD (Follow up/).  HPI  Mr. Contreras is a 69M PMH allergies, sinusitis, HLD, hearing loss, GERD, who is referred to pulmonary for COPD and lung nodule.     Last visit 10/25/24: repeat CT chest, anoror ellipta vs stiolto, alpha 1 antitrypsin, follow up with ENT    #Dyspnea  #Moderate COPD  He though albuterol raised his BP and stopped it  He had some bad dreams so he stopped his headache medicine.   He stopped inhalers about a year.   Says he feel 2/10 today. He has eye pain and neck tightness, ear pain. He has been having low energy. Goes to bed at 7pm, up at 3am, like his old work schedule. Then has a lot of coffee. He has been avoiding social events also. Denies SI and HI.  He has had a lot of congestion. He thinks he doesn't have seasonal allergies. He has been watching his brothers cat.     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   HENT:  Positive for sinus pressure, sinus pain, sneezing and tinnitus. Negative for congestion, hearing loss, postnasal drip, rhinorrhea, trouble swallowing and voice change.         Describes more neck tightness than trouble swallowing  Voice cracks per patient,   Eyes:  Positive for pain.   Respiratory:  Negative for cough, chest tightness, shortness of breath and wheezing.         Wheezing in AM   Cardiovascular:  Negative for chest pain, palpitations and leg swelling.   Gastrointestinal:  Negative for nausea.    Skin:  Negative for rash.        Subjective redness to chest   Allergic/Immunologic: Negative for environmental allergies.   Hematological:  Negative for adenopathy.       Objective   Physical Exam  Vitals reviewed.   Constitutional:       Appearance: Normal appearance.   HENT:      Head: Normocephalic and atraumatic.      Nose: No rhinorrhea.      Mouth/Throat:      Mouth: Mucous membranes are moist.      Pharynx: No posterior oropharyngeal erythema.   Eyes:      Extraocular Movements: Extraocular movements intact.   Cardiovascular:      Rate and Rhythm: Normal rate and regular rhythm.      Heart sounds: No murmur heard.  Pulmonary:      Effort: Pulmonary effort is normal.      Breath sounds: No wheezing or rhonchi.   Abdominal:      Palpations: Abdomen is soft.   Musculoskeletal:      Cervical back: Neck supple. No tenderness.      Right lower leg: No edema.      Left lower leg: No edema.   Lymphadenopathy:      Cervical: No cervical adenopathy.   Skin:     General: Skin is warm and dry.      Comments: Mild blanching erythema on chest   Neurological:      General: No focal deficit present.      Mental Status: He is alert and oriented to person, place, and time.   Psychiatric:      Comments: anxious       TTE not done    PFT 5/20/24  FVC 4.45 99%, FEV1 2.18 64%, no BD response  TLC 8.94 120%, gas trapping present  DLCO 22.14 79%  Moderate obstruction, + BD response, hyperinflation and air trapping present, DLCO normal    CT chest 4/21/25  IMPRESSION:  1.  Few small bilateral noncalcified pulmonary nodules measuring up  to 5 mm, likely benign. Continued screening with low-dose noncontrast  chest CT in 12 months (from current date) is recommended.  2. Estimated coronary artery calcium score is 15* which correlates  with at least 25th percentile rank as compared to matched MARTINEZ-study  subjects([email or web address]://[email or web address]-[email or web address]/Calcium/input.aspx).    CT chest 4/22/24  IMPRESSION:  1. Resolution of previously new scattered  areas of mild bronchiolitis  in the right lung. Underlying smoking-related lung disease and stable  small nodular densities which are likely benign. Advise attention on  ongoing annual low-dose lung cancer screening CT.    CT chest 1/10/24  FINDINGS:  LUNGS AND AIRWAYS:  The trachea and central airways are patent. No endobronchial lesion  is seen.  There is mild bilateral upper lung predominant centrilobular and  paraseptal emphysema.There is no focal consolidation, pleural  effusion, or pneumothorax.  Multiple areas of ground-glass opacity and tree-in-bud nodularity in  the right upper and right middle lobe, new compared to prior study.  3 mm right lower lobe nodule, image 134/379, stable.  4 mm right lower lobe nodule, image 240/379, stable.  5 mm right lower lobe nodule, image 269/379, stable.  MEDIASTINUM AND GENI, LOWER NECK AND AXILLA:  The visualized thyroid gland is within normal limits.  No evidence of thoracic lymphadenopathy by CT criteria.  Esophagus appears within normal limits as seen.  HEART AND VESSELS:  Ectatic ascending thoracic aorta measuring 3.9 cm.There is mild  scattered calcified atherosclerosis present. Main pulmonary artery  and its branches are normal in caliber. Mild coronary artery  calcifications are seen. Please note,the study is not optimized for  evaluation of coronary arteries. The cardiac chambers are not  enlarged. There is no pericardial effusion seen.  UPPER ABDOMEN:  1.5 cm hypodense lesion in the right kidney, likely cyst  CHEST WALL AND OSSEOUS STRUCTURES:  Chest wall is within normal limits.  No acute osseous pathology.There are no suspicious osseous lesions.      IMPRESSION:  1. Multiple areas of ground-glass opacity and tree-in-bud nodularity  predominantly in the right upper and right middle lobe, likely  inflammatory/infectious in etiology. Recommend short-term follow-up  chest CT in 3 months to confirm resolution and establish a new  baseline.  2. Scattered  predominantly right-sided pulmonary nodules measuring to 5 mm, stable and as described above.  3. Mild upper lung predominant emphysema.  4. Mild coronary artery calcification.  5. Other findings as described above    CT sinus 1/10/2024  IMPRESSION:  *Polypoid mucosal thickening left sphenoid sinus with mineralized  inspissated mucus secretions *Previous fracture right medial orbital  wall without muscle entrapment *Likely postoperative bony hypertrophy  in the right alveolar recess secondary to dental implant versus  osteoma    Assessment/Plan   Diagnoses and all orders for this visit:  Sinusitis, unspecified chronicity, unspecified location  -     azithromycin (Zithromax) 250 mg tablet; Take 2 by mouth today then 1 daily for 4 days  Chronic obstructive pulmonary disease, unspecified COPD type (Multi)  -     umeclidinium-vilanteroL (Anoro Ellipta) 62.5-25 mcg/actuation blister with inhaler device; Inhale 1 puff once daily.  -     azithromycin (Zithromax) 250 mg tablet; Take 2 by mouth today then 1 daily for 4 days  -     Follow Up In Pulmonology; Future  Dyspnea on exertion  -     albuterol 90 mcg/actuation inhaler; Inhale 2 puffs every 6 hours if needed for wheezing.  Lung nodules  -     Follow Up In Pulmonology; Future      Mr. Contreras is a 69M PMH allergies, sinusitis, HLD, hearing loss, GERD, who is referred to pulmonary for COPD and lung nodule.     #Lung nodule  #Former tobacco user  Repeat CT 4/2026    #COPD  #Dyspnea on exertion  #Cough  Encouraged patient to resume COPD treatment with LABA/LAMA  Provided stiolto samples today. Patient states anoro ellipta is too expensive but this appears to be the only covered LABA/LAMA. Offered referral to pharmacy team. Patient declined.   Encouraged exercise     #Sinusitis  Continue to follow with ENT  Azithromycin for 5 days ordered    RTC 4-6 months     Guerita Rodrigues MD 04/25/25 12:11 PM

## 2025-04-25 ENCOUNTER — APPOINTMENT (OUTPATIENT)
Dept: PULMONOLOGY | Facility: CLINIC | Age: 70
End: 2025-04-25
Payer: MEDICARE

## 2025-04-25 VITALS
OXYGEN SATURATION: 97 % | WEIGHT: 197 LBS | BODY MASS INDEX: 26.68 KG/M2 | TEMPERATURE: 97.7 F | SYSTOLIC BLOOD PRESSURE: 147 MMHG | HEART RATE: 70 BPM | HEIGHT: 72 IN | DIASTOLIC BLOOD PRESSURE: 92 MMHG

## 2025-04-25 DIAGNOSIS — J32.9 SINUSITIS, UNSPECIFIED CHRONICITY, UNSPECIFIED LOCATION: Primary | ICD-10-CM

## 2025-04-25 DIAGNOSIS — R06.09 DYSPNEA ON EXERTION: ICD-10-CM

## 2025-04-25 DIAGNOSIS — R91.8 LUNG NODULES: ICD-10-CM

## 2025-04-25 DIAGNOSIS — J44.9 CHRONIC OBSTRUCTIVE PULMONARY DISEASE, UNSPECIFIED COPD TYPE (MULTI): ICD-10-CM

## 2025-04-25 PROCEDURE — 1160F RVW MEDS BY RX/DR IN RCRD: CPT | Performed by: INTERNAL MEDICINE

## 2025-04-25 PROCEDURE — 1036F TOBACCO NON-USER: CPT | Performed by: INTERNAL MEDICINE

## 2025-04-25 PROCEDURE — 1126F AMNT PAIN NOTED NONE PRSNT: CPT | Performed by: INTERNAL MEDICINE

## 2025-04-25 PROCEDURE — 1159F MED LIST DOCD IN RCRD: CPT | Performed by: INTERNAL MEDICINE

## 2025-04-25 PROCEDURE — 1157F ADVNC CARE PLAN IN RCRD: CPT | Performed by: INTERNAL MEDICINE

## 2025-04-25 PROCEDURE — 3008F BODY MASS INDEX DOCD: CPT | Performed by: INTERNAL MEDICINE

## 2025-04-25 PROCEDURE — 99214 OFFICE O/P EST MOD 30 MIN: CPT | Performed by: INTERNAL MEDICINE

## 2025-04-25 RX ORDER — AZITHROMYCIN 250 MG/1
TABLET, FILM COATED ORAL
Qty: 6 TABLET | Refills: 0 | Status: SHIPPED | OUTPATIENT
Start: 2025-04-25 | End: 2025-04-30

## 2025-04-25 RX ORDER — TIOTROPIUM BROMIDE AND OLODATEROL 3.124; 2.736 UG/1; UG/1
2 SPRAY, METERED RESPIRATORY (INHALATION) DAILY
COMMUNITY

## 2025-04-25 RX ORDER — ALBUTEROL SULFATE 90 UG/1
2 INHALANT RESPIRATORY (INHALATION) EVERY 6 HOURS PRN
Qty: 18 G | Refills: 11 | Status: SHIPPED | OUTPATIENT
Start: 2025-04-25 | End: 2026-04-25

## 2025-04-25 RX ORDER — UMECLIDINIUM BROMIDE AND VILANTEROL TRIFENATATE 62.5; 25 UG/1; UG/1
1 POWDER RESPIRATORY (INHALATION) DAILY
Qty: 30 EACH | Refills: 3 | Status: SHIPPED | OUTPATIENT
Start: 2025-04-25 | End: 2025-08-23

## 2025-04-25 ASSESSMENT — COPD QUESTIONNAIRES
QUESTION2_CHESTPHLEGM: 3
QUESTION1_COUGHFREQUENCY: 1
QUESTION3_CHESTTIGHTNESS: 5 - MY CHEST FEELS VERY TIGHT
QUESTION4_WALKINCLINE: 2
QUESTION8_ENERGYLEVEL: 1
QUESTION7_SLEEPQUALITY: 2
QUESTION6_LEAVINGHOUSE: 0 - I AM CONFIDENT LEAVING MY HOME DESPITE MY LUNG CONDITION
CAT_TOTALSCORE: 14
QUESTION5_HOMEACTIVITIES: 0 - I AM NOT LIMITED DOING ANY ACTIVITIES AT HOME

## 2025-04-25 ASSESSMENT — PATIENT HEALTH QUESTIONNAIRE - PHQ9
SUM OF ALL RESPONSES TO PHQ9 QUESTIONS 1 AND 2: 0
2. FEELING DOWN, DEPRESSED OR HOPELESS: NOT AT ALL
1. LITTLE INTEREST OR PLEASURE IN DOING THINGS: NOT AT ALL

## 2025-04-25 ASSESSMENT — ENCOUNTER SYMPTOMS
WHEEZING: 0
RHINORRHEA: 0
VOICE CHANGE: 0
SHORTNESS OF BREATH: 0
TROUBLE SWALLOWING: 0

## 2025-04-25 ASSESSMENT — PAIN SCALES - GENERAL: PAINLEVEL_OUTOF10: 0-NO PAIN

## 2025-04-25 NOTE — PATIENT INSTRUCTIONS
Thank you for coming in today! Please call with questions or concerns.    COPD is a slowly progressive disease that has symptoms of cough, wheezing, shortness of breath.  We treat it with inhalers, avoiding infection, getting regularly recommended vaccines, quitting smoking, and exercising.    Please schedule:  - Repeat CAT scan 4/2026  - Call your neurologist to discuss medication changes    Please start:  - Azithromycin - this is a sinus medicine. This has a side effect of diarrhea  - Stiolto - this is the sample inhaler. 2 puffs daily. If you like it we can try to cover  with our samples. You have the anoro ellipta prescribed which is similar to this. I can refer you to pharmacy teams to get it covered if we need to     Return to clinic in 4-6 months    Call [phone] to schedule tests

## 2025-04-29 ENCOUNTER — APPOINTMENT (OUTPATIENT)
Dept: RADIOLOGY | Facility: CLINIC | Age: 70
End: 2025-04-29
Payer: MEDICARE

## 2025-05-09 ENCOUNTER — APPOINTMENT (OUTPATIENT)
Dept: NEUROLOGY | Facility: CLINIC | Age: 70
End: 2025-05-09
Payer: MEDICARE

## 2025-06-12 ENCOUNTER — TELEPHONE (OUTPATIENT)
Facility: CLINIC | Age: 70
End: 2025-06-12
Payer: MEDICARE

## 2025-06-12 NOTE — TELEPHONE ENCOUNTER
Pt. Came in the office today and stated that he has been having a lot of discomfort with his eyes and requested a script for eye drops and wants to avoid going to the eye doctor if possible.

## 2025-07-04 DIAGNOSIS — K21.9 GASTRO-ESOPHAGEAL REFLUX DISEASE WITHOUT ESOPHAGITIS: ICD-10-CM

## 2025-07-07 DIAGNOSIS — K21.9 GASTRO-ESOPHAGEAL REFLUX DISEASE WITHOUT ESOPHAGITIS: ICD-10-CM

## 2025-07-07 RX ORDER — PANTOPRAZOLE SODIUM 40 MG/1
40 TABLET, DELAYED RELEASE ORAL DAILY
Qty: 90 TABLET | Refills: 1 | OUTPATIENT
Start: 2025-07-07

## 2025-07-07 NOTE — TELEPHONE ENCOUNTER
Last seen in 12/11/2024, please call and schedule appointment.  Can send in short supply if needed, just let us know.  Thanks ladies!

## 2025-07-07 NOTE — TELEPHONE ENCOUNTER
Rx Refill Request Telephone Encounter    Name:  Gary Contreras  : 1955     Medication Name:  PANTOPRAZOLE  Dose (Optional):    40 MG  Quantity (Optional):      Directions (Optional):   1 TABLET DAILY    ALLERGIES:   ON FILE    Specific Pharmacy location:  [facility]    Date of last appointment:  24  Date of next appointment:  NONE    Best number to reach patient:  [phone]

## 2025-07-08 DIAGNOSIS — K21.9 GASTRO-ESOPHAGEAL REFLUX DISEASE WITHOUT ESOPHAGITIS: ICD-10-CM

## 2025-07-10 RX ORDER — PANTOPRAZOLE SODIUM 40 MG/1
40 TABLET, DELAYED RELEASE ORAL DAILY
Qty: 90 TABLET | Refills: 1 | Status: SHIPPED | OUTPATIENT
Start: 2025-07-10

## 2025-07-10 RX ORDER — PANTOPRAZOLE SODIUM 40 MG/1
40 TABLET, DELAYED RELEASE ORAL DAILY
Qty: 30 TABLET | Refills: 0 | Status: SHIPPED | OUTPATIENT
Start: 2025-07-10

## 2025-07-11 ENCOUNTER — OFFICE VISIT (OUTPATIENT)
Dept: PRIMARY CARE | Facility: CLINIC | Age: 70
End: 2025-07-11
Payer: MEDICARE

## 2025-07-11 VITALS
DIASTOLIC BLOOD PRESSURE: 70 MMHG | WEIGHT: 203 LBS | BODY MASS INDEX: 27.5 KG/M2 | SYSTOLIC BLOOD PRESSURE: 118 MMHG | HEIGHT: 72 IN | TEMPERATURE: 97.8 F | OXYGEN SATURATION: 98 % | HEART RATE: 70 BPM

## 2025-07-11 DIAGNOSIS — J01.00 ACUTE MAXILLARY SINUSITIS, RECURRENCE NOT SPECIFIED: Primary | ICD-10-CM

## 2025-07-11 PROCEDURE — 1159F MED LIST DOCD IN RCRD: CPT | Performed by: FAMILY MEDICINE

## 2025-07-11 PROCEDURE — 99213 OFFICE O/P EST LOW 20 MIN: CPT | Performed by: FAMILY MEDICINE

## 2025-07-11 PROCEDURE — 1036F TOBACCO NON-USER: CPT | Performed by: FAMILY MEDICINE

## 2025-07-11 PROCEDURE — 3008F BODY MASS INDEX DOCD: CPT | Performed by: FAMILY MEDICINE

## 2025-07-11 RX ORDER — CEFDINIR 300 MG/1
600 CAPSULE ORAL DAILY
Qty: 20 CAPSULE | Refills: 0 | Status: SHIPPED | OUTPATIENT
Start: 2025-07-11 | End: 2025-07-21

## 2025-07-11 NOTE — PROGRESS NOTES
Subjective   Patient ID: Gary Contreras is a 70 y.o. male who presents for Sinusitis.  HPI    Patient presents in office for sinus infection. Ongoing x 3 days. Symptoms included sinus pressure and fatigued. Has not tried anything at the moment.     He continues to have facial numbness in his lips and gum area, seen and being managed by neurologist Dr. Bellamy. Also report cavity in teeth.      Review of systems  ; Patient seen today for exam denies any problems with headaches or vision, denies any shortness of breath chest pain nausea or vomiting, no black stool no blood in the stool no heartburn type symptoms denies any problems with constipation or diarrhea, and no dysuria-type symptoms    The patient's allergies medications were reviewed with them today    The patient's social family and surgical history or also reviewed here today, along with her past medical history.     Objective     Physical examination;  vital signs are as noted, alert and oriented in no acute distress  HEENT exam TMs are showing fluid bilaterally, pharynx reveals postnasal drainage noted, positive pain over maxillary and frontal sinuses, positive shotty adenopathy noted bilateral neck, neck exam was supple without masses heart regular rate and rhythm without gallops lungs clear to auscultation with no wheezing or rhonchi bilaterally , extremities no edema skin without any changes    /70 (BP Location: Right arm, Patient Position: Sitting, BP Cuff Size: Adult long)   Pulse 70   Temp 36.6 °C (97.8 °F) (Temporal)   Ht 1.829 m (6')   Wt 92.1 kg (203 lb)   SpO2 98%   BMI 27.53 kg/m²         Assessment/Plan   Problem List Items Addressed This Visit    None  Visit Diagnoses         Acute maxillary sinusitis, recurrence not specified    -  Primary    Relevant Medications    cefdinir (Omnicef) 300 mg capsule      BMI 27.0-27.9,adult                Prescribed Cefdinir 600 mg every day for 10 days.    Drink plenty of fluid, water,  Gatorade, warm tea with honey.    If anything worsens or changes please call us at once, follow up in the office as planned,       Scribe Attestation  By signing my name below, I, Yamil Sinha   attest that this documentation has been prepared under the direction and in the presence of Tutu Fay DO.    All medical record entries made by the Scribe were at my direction and personally dictated by me.   I have reviewed the chart and agree that the record accurately reflects my personal performance of the history, physical exam, discussion, and plan.

## 2025-07-16 ENCOUNTER — APPOINTMENT (OUTPATIENT)
Dept: PRIMARY CARE | Facility: CLINIC | Age: 70
End: 2025-07-16
Payer: MEDICARE

## 2025-07-30 ENCOUNTER — APPOINTMENT (OUTPATIENT)
Dept: PRIMARY CARE | Facility: CLINIC | Age: 70
End: 2025-07-30
Payer: MEDICARE

## 2025-08-05 NOTE — PROGRESS NOTES
"Subjective   Patient ID: Gary Contreras is a 70 y.o. male who presents for GERD and Follow-up.  Facial pain  HPI    Patient presents in office to follow up on GERD. Patient is taking pantoprazole 40 mg. Patient denies complaints or concerns with medication.     Patient continues to have facial pain and pressure, nasal congestion and associated difficulty breathing. Other symptoms include eye aches and puffiness, brain fog, and headaches. Patient claims that his tinnitus never really resolved. He underwent endoscopic sphenoid sinusotomy by ENT Dr. Ryan Adams in October 2024 but the surgery has never resolved these symptoms. Dr. Adams recommended neurology follow-up. Does not take gabapentin or any medication. He denies smoking. He drinks 3-4 beers every 2 weeks.    Patient reports he feels he is having memory loss. He states that the people around him are concerned about having Alzheimer's. He also reports he noticed he was saying \"stupid\" things at a family get-together. He was not drunk. His Brain MRI showed no evidence of tumor. He was able to recall 3/3 words. He was able to recall the name of the president. He was able to subtract 100-7 and kept going on.    Patient admits to experiencing heaviness in her legs.  Also multiple other somatic complaints states he sleeps well does not feel like he is nervous or anxious    We discussed possibly using the Cymbalta like medicine can help with chronic nerve pain such as the gabapentin the little different he defers at this time      Review of systems  ; Patient seen today for exam denies any problems with headaches or vision, denies any shortness of breath chest pain nausea or vomiting, no black stool no blood in the stool no heartburn type symptoms denies any problems with constipation or diarrhea, and no dysuria-type symptoms    The patient's allergies medications were reviewed with them today    The patient's social family and surgical history or " also reviewed here today, along with her past medical history.     Objective     Alert and active in  no acute distress  HEENT TMs clear oropharynx negative nares clear no drainage noted neck supple  With no adenopathy   Heart regular rate and rhythm without murmur and no carotid bruits  Lungs- clear to auscultation bilaterally, no wheeze or rhonchi noted  Thyroid -negative masses or nodularity  Abdomen- soft times four quadrants , bowel sounds positive no masses or organomegaly, negative tenderness guarding or rebound  Neuroexam cranials 2 through gross grossly intact no focal deficit noted at all  skin -no lesions noted      /74 (BP Location: Right arm, Patient Position: Sitting, BP Cuff Size: Adult long)   Pulse 71   Temp 36.6 °C (97.8 °F) (Temporal)   Ht 1.829 m (6')   Wt 89.4 kg (197 lb)   SpO2 99%   BMI 26.72 kg/m²         Assessment/Plan   Problem List Items Addressed This Visit       Dyslipidemia    Relevant Orders    Lipoprotein a    Fatigue    Relevant Orders    CBC and Auto Differential    Comprehensive Metabolic Panel    BMI 26.0-26.9,adult    Prostate cancer screening    Relevant Orders    Prostate Specific Antigen, Screen     Other Visit Diagnoses         Chronic malaise    -  Primary    Relevant Orders    Vitamin B12    TSH with reflex to Free T4 if abnormal      Vitamin D deficiency        Relevant Orders    Vitamin D 25-Hydroxy,Total (for eval of Vitamin D levels)      Seasonal allergies        Relevant Medications    olopatadine (Patanol) 0.1 % ophthalmic solution            Patient should follow up with ENT regarding ongoing persistent facial pain and pressure, which have persisted despite previous sinus surgery. If symptoms do not improve after ENT follow-up, a neurology evaluation should be pursued as previously recommended by ENT.    Recommended to follow up with an optometrist or ophthalmologist to check and monitor eye pressure.    For COPD and breathing issues, patient is advised  to follow up with Dr. Rodrigues for monitoring and management.    Patient is strongly advised to quit drinking alcoholic beverages, as it may negatively impact his overall health and symptom management.    Memory exam performed today was reassuring, with no signs or symptoms of Alzheimer’s disease.  Recent brain MRI was reviewed and showed no evidence of a tumor.    Will check blood work to assist in further evaluation and management.    Labs have been ordered, she/he will have these performed and we will contact her/him with results.  (CBC, CMP, PSA, Lipoprotein, TSH, Vitamin D, Vitamin B12)    Prescribed Patanol eye drops to be used 1 drop in both eyes BID as needed for seasonal allergies.    If anything worsens or changes please call us at once, follow up in the office as planned,       Scribe Attestation  By signing my name below, IDanielle Scribe attest that this documentation has been prepared under the direction and in the presence of Tutu Fay DO.    All medical record entries made by the Scribe were at my direction and personally dictated by me.   I have reviewed the chart and agree that the record accurately reflects my personal performance of the history, physical exam, discussion, and plan.

## 2025-08-06 ENCOUNTER — APPOINTMENT (OUTPATIENT)
Dept: PRIMARY CARE | Facility: CLINIC | Age: 70
End: 2025-08-06
Payer: MEDICARE

## 2025-08-06 VITALS
HEIGHT: 72 IN | HEART RATE: 71 BPM | WEIGHT: 197 LBS | TEMPERATURE: 97.8 F | OXYGEN SATURATION: 99 % | DIASTOLIC BLOOD PRESSURE: 74 MMHG | SYSTOLIC BLOOD PRESSURE: 128 MMHG | BODY MASS INDEX: 26.68 KG/M2

## 2025-08-06 DIAGNOSIS — R53.81 CHRONIC MALAISE: Primary | ICD-10-CM

## 2025-08-06 DIAGNOSIS — E55.9 VITAMIN D DEFICIENCY: ICD-10-CM

## 2025-08-06 DIAGNOSIS — E78.5 DYSLIPIDEMIA: ICD-10-CM

## 2025-08-06 DIAGNOSIS — J30.2 SEASONAL ALLERGIES: ICD-10-CM

## 2025-08-06 DIAGNOSIS — Z12.5 PROSTATE CANCER SCREENING: ICD-10-CM

## 2025-08-06 DIAGNOSIS — R53.83 FATIGUE, UNSPECIFIED TYPE: ICD-10-CM

## 2025-08-06 PROCEDURE — 3008F BODY MASS INDEX DOCD: CPT | Performed by: FAMILY MEDICINE

## 2025-08-06 PROCEDURE — 1159F MED LIST DOCD IN RCRD: CPT | Performed by: FAMILY MEDICINE

## 2025-08-06 PROCEDURE — 1160F RVW MEDS BY RX/DR IN RCRD: CPT | Performed by: FAMILY MEDICINE

## 2025-08-06 PROCEDURE — 99214 OFFICE O/P EST MOD 30 MIN: CPT | Performed by: FAMILY MEDICINE

## 2025-08-06 RX ORDER — OLOPATADINE HYDROCHLORIDE 1 MG/ML
1 SOLUTION OPHTHALMIC 2 TIMES DAILY PRN
Qty: 5 ML | Refills: 1 | Status: SHIPPED | OUTPATIENT
Start: 2025-08-06 | End: 2025-12-04

## 2025-08-07 LAB
A1AT PHENOTYP SERPL-IMP: NORMAL
A1AT SERPL-MCNC: 157 MG/DL (ref 83–199)

## 2025-08-09 LAB
25(OH)D3+25(OH)D2 SERPL-MCNC: 25 NG/ML (ref 30–100)
ALBUMIN SERPL-MCNC: 5 G/DL (ref 3.6–5.1)
ALP SERPL-CCNC: 40 U/L (ref 35–144)
ALT SERPL-CCNC: 9 U/L (ref 9–46)
ANION GAP SERPL CALCULATED.4IONS-SCNC: 14 MMOL/L (CALC) (ref 7–17)
AST SERPL-CCNC: 13 U/L (ref 10–35)
BASOPHILS # BLD AUTO: 79 CELLS/UL (ref 0–200)
BASOPHILS NFR BLD AUTO: 1.1 %
BILIRUB SERPL-MCNC: 1.5 MG/DL (ref 0.2–1.2)
BUN SERPL-MCNC: 9 MG/DL (ref 7–25)
CALCIUM SERPL-MCNC: 9.4 MG/DL (ref 8.6–10.3)
CHLORIDE SERPL-SCNC: 103 MMOL/L (ref 98–110)
CO2 SERPL-SCNC: 24 MMOL/L (ref 20–32)
CREAT SERPL-MCNC: 0.96 MG/DL (ref 0.7–1.35)
EGFRCR SERPLBLD CKD-EPI 2021: 86 ML/MIN/1.73M2
EOSINOPHIL # BLD AUTO: 79 CELLS/UL (ref 15–500)
EOSINOPHIL NFR BLD AUTO: 1.1 %
ERYTHROCYTE [DISTWIDTH] IN BLOOD BY AUTOMATED COUNT: 13.1 % (ref 11–15)
GLUCOSE SERPL-MCNC: 101 MG/DL (ref 65–99)
HCT VFR BLD AUTO: 42 % (ref 38.5–50)
HGB BLD-MCNC: 13.8 G/DL (ref 13.2–17.1)
LPA SERPL-SCNC: 54 NMOL/L
LYMPHOCYTES # BLD AUTO: 1498 CELLS/UL (ref 850–3900)
LYMPHOCYTES NFR BLD AUTO: 20.8 %
MCH RBC QN AUTO: 30.5 PG (ref 27–33)
MCHC RBC AUTO-ENTMCNC: 32.9 G/DL (ref 32–36)
MCV RBC AUTO: 92.7 FL (ref 80–100)
MONOCYTES # BLD AUTO: 605 CELLS/UL (ref 200–950)
MONOCYTES NFR BLD AUTO: 8.4 %
NEUTROPHILS # BLD AUTO: 4939 CELLS/UL (ref 1500–7800)
NEUTROPHILS NFR BLD AUTO: 68.6 %
PLATELET # BLD AUTO: 250 THOUSAND/UL (ref 140–400)
PMV BLD REES-ECKER: 9.8 FL (ref 7.5–12.5)
POTASSIUM SERPL-SCNC: 4.4 MMOL/L (ref 3.5–5.3)
PROT SERPL-MCNC: 7.3 G/DL (ref 6.1–8.1)
PSA SERPL-MCNC: 1.16 NG/ML
RBC # BLD AUTO: 4.53 MILLION/UL (ref 4.2–5.8)
SODIUM SERPL-SCNC: 141 MMOL/L (ref 135–146)
TSH SERPL-ACNC: 1.98 MIU/L (ref 0.4–4.5)
VIT B12 SERPL-MCNC: 200 PG/ML (ref 200–1100)
WBC # BLD AUTO: 7.2 THOUSAND/UL (ref 3.8–10.8)

## 2025-08-11 LAB
A1AT PHENOTYP SERPL-IMP: NORMAL
A1AT SERPL-MCNC: 157 MG/DL (ref 83–199)

## 2025-08-12 ENCOUNTER — RESULTS FOLLOW-UP (OUTPATIENT)
Dept: PULMONOLOGY | Facility: HOSPITAL | Age: 70
End: 2025-08-12
Payer: MEDICARE

## 2025-08-19 ENCOUNTER — APPOINTMENT (OUTPATIENT)
Dept: OPHTHALMOLOGY | Facility: CLINIC | Age: 70
End: 2025-08-19
Payer: MEDICARE

## 2025-08-19 DIAGNOSIS — H02.88A MEIBOMIAN GLAND DYSFUNCTION (MGD) OF UPPER AND LOWER LIDS OF BOTH EYES: Primary | ICD-10-CM

## 2025-08-19 DIAGNOSIS — H52.4 HYPEROPIA OF BOTH EYES WITH ASTIGMATISM AND PRESBYOPIA: ICD-10-CM

## 2025-08-19 DIAGNOSIS — H02.88B MEIBOMIAN GLAND DYSFUNCTION (MGD) OF UPPER AND LOWER LIDS OF BOTH EYES: Primary | ICD-10-CM

## 2025-08-19 DIAGNOSIS — H52.203 HYPEROPIA OF BOTH EYES WITH ASTIGMATISM AND PRESBYOPIA: ICD-10-CM

## 2025-08-19 DIAGNOSIS — H52.03 HYPEROPIA OF BOTH EYES WITH ASTIGMATISM AND PRESBYOPIA: ICD-10-CM

## 2025-08-19 PROCEDURE — 92015 DETERMINE REFRACTIVE STATE: CPT

## 2025-08-19 PROCEDURE — 92004 COMPRE OPH EXAM NEW PT 1/>: CPT

## 2025-08-19 ASSESSMENT — PATIENT HEALTH QUESTIONNAIRE - PHQ9
1. LITTLE INTEREST OR PLEASURE IN DOING THINGS: NOT AT ALL
2. FEELING DOWN, DEPRESSED OR HOPELESS: NOT AT ALL
SUM OF ALL RESPONSES TO PHQ9 QUESTIONS 1 AND 2: 0

## 2025-08-19 ASSESSMENT — REFRACTION_MANIFEST
OD_ADD: +2.25
OD_SPHERE: +1.75
OD_AXIS: 100
OS_AXIS: 080
OD_SPHERE: +1.50
OS_ADD: +2.25
OS_CYLINDER: -0.75
OD_CYLINDER: -0.50
OD_AXIS: 097
OS_SPHERE: +1.25
OS_AXIS: 080
OD_CYLINDER: -0.50
OS_CYLINDER: -0.75
METHOD_AUTOREFRACTION: 1
OS_SPHERE: +1.50

## 2025-08-19 ASSESSMENT — ENCOUNTER SYMPTOMS
HEMATOLOGIC/LYMPHATIC NEGATIVE: 0
MUSCULOSKELETAL NEGATIVE: 0
NEUROLOGICAL NEGATIVE: 0
PSYCHIATRIC NEGATIVE: 0
RESPIRATORY NEGATIVE: 0
ALLERGIC/IMMUNOLOGIC NEGATIVE: 0
EYES NEGATIVE: 1
CARDIOVASCULAR NEGATIVE: 0
CONSTITUTIONAL NEGATIVE: 0
ENDOCRINE NEGATIVE: 0
GASTROINTESTINAL NEGATIVE: 0

## 2025-08-19 ASSESSMENT — KERATOMETRY
OD_K1POWER_DIOPTERS: 41.75
OD_AXISANGLE_DEGREES: 22
OS_AXISANGLE2_DEGREES: 64
OD_AXISANGLE2_DEGREES: 112
OS_K1POWER_DIOPTERS: 41.75
METHOD_AUTO_MANUAL: AUTOMATED
OD_K2POWER_DIOPTERS: 42.25
OS_AXISANGLE_DEGREES: 154
OS_K2POWER_DIOPTERS: 42.00

## 2025-08-19 ASSESSMENT — VISUAL ACUITY
OS_SC: 20/40
OD_PH_SC: 20/40
OS_PH_SC: 20/25
OD_SC: 20/50
OS_SC+: -1
METHOD: SNELLEN - SINGLE
OD_SC+: -2

## 2025-08-19 ASSESSMENT — CONF VISUAL FIELD
OD_INFERIOR_TEMPORAL_RESTRICTION: 0
OD_SUPERIOR_TEMPORAL_RESTRICTION: 0
OD_INFERIOR_NASAL_RESTRICTION: 0
OD_SUPERIOR_NASAL_RESTRICTION: 0
OS_SUPERIOR_NASAL_RESTRICTION: 0
OS_INFERIOR_TEMPORAL_RESTRICTION: 0
OS_NORMAL: 1
OD_NORMAL: 1
OS_SUPERIOR_TEMPORAL_RESTRICTION: 0
OS_INFERIOR_NASAL_RESTRICTION: 0

## 2025-08-19 ASSESSMENT — SLIT LAMP EXAM - LIDS
COMMENTS: 2+ MGD
COMMENTS: 2-3+ MGD

## 2025-08-19 ASSESSMENT — CUP TO DISC RATIO
OS_RATIO: 0.40
OD_RATIO: 0.45

## 2025-08-19 ASSESSMENT — EXTERNAL EXAM - RIGHT EYE: OD_EXAM: NORMAL

## 2025-08-19 ASSESSMENT — PAIN SCALES - GENERAL: PAINLEVEL_OUTOF10: 0-NO PAIN

## 2025-08-19 ASSESSMENT — EXTERNAL EXAM - LEFT EYE: OS_EXAM: NORMAL

## 2025-08-19 ASSESSMENT — TONOMETRY
OD_IOP_MMHG: 18
OS_IOP_MMHG: 18
IOP_METHOD: GOLDMANN APPLANATION

## 2025-10-30 ENCOUNTER — APPOINTMENT (OUTPATIENT)
Facility: CLINIC | Age: 70
End: 2025-10-30
Payer: MEDICARE

## (undated) DEVICE — TRAP, SPECIMEN, NEPTUNE QUICK COLLECTOR

## (undated) DEVICE — TRACKER, STINGRAY, NON-INVASIVE, AXIEM STEALTH NAVIGATION

## (undated) DEVICE — TRACKER, INSTRUMENT

## (undated) DEVICE — TUBE, SALEM SUMP, 16 FR X 48IN, ENFIT

## (undated) DEVICE — COVER HANDLE LIGHT, STERIS, BLUE, STERILE

## (undated) DEVICE — ELECTRODE, ELECTROSURGICAL, COAGULATOR, W/SUCTION, HANDSWITCHING, 8 FR, 6 IN

## (undated) DEVICE — CONTAINER, SPECIMEN, 120 ML, STERILE

## (undated) DEVICE — SYRINGE, 3 CC, LUER SLIP

## (undated) DEVICE — DRESSING, TRANSPARENT, TEGADERM, 2-3/8 X 2-3/4 IN

## (undated) DEVICE — DRAPE, FLUID WARMER

## (undated) DEVICE — STAPLER, SKIN, FIXED HEAD, WIDE, 35

## (undated) DEVICE — GLOVE, SURGICAL, PROTEXIS PI , 7.5, PF, LF

## (undated) DEVICE — DRAPE, INSTRUMENT, W/POUCH, STERI DRAPE, 7 X 11 IN, DISPOSABLE, STERILE

## (undated) DEVICE — Device

## (undated) DEVICE — TOWEL, SURGICAL, NEURO, O/R, 16 X 26, BLUE, STERILE